# Patient Record
Sex: FEMALE | Race: WHITE | NOT HISPANIC OR LATINO | Employment: OTHER | URBAN - METROPOLITAN AREA
[De-identification: names, ages, dates, MRNs, and addresses within clinical notes are randomized per-mention and may not be internally consistent; named-entity substitution may affect disease eponyms.]

---

## 2017-04-24 ENCOUNTER — HOSPITAL ENCOUNTER (OUTPATIENT)
Facility: CLINIC | Age: 62
Discharge: HOME | End: 2017-04-25
Attending: OBSTETRICS & GYNECOLOGY | Admitting: OBSTETRICS & GYNECOLOGY
Payer: COMMERCIAL

## 2017-04-24 ENCOUNTER — ANESTHESIA EVENT (OUTPATIENT)
Dept: SURGERY | Facility: CLINIC | Age: 62
End: 2017-04-24
Payer: COMMERCIAL

## 2017-04-24 ENCOUNTER — ANESTHESIA (OUTPATIENT)
Dept: SURGERY | Facility: CLINIC | Age: 62
End: 2017-04-24
Payer: COMMERCIAL

## 2017-04-24 DIAGNOSIS — Z90.710 S/P LAPAROSCOPIC HYSTERECTOMY: Primary | ICD-10-CM

## 2017-04-24 PROCEDURE — 25000128 H RX IP 250 OP 636: Performed by: ANESTHESIOLOGY

## 2017-04-24 PROCEDURE — 25000132 ZZH RX MED GY IP 250 OP 250 PS 637: Performed by: OBSTETRICS & GYNECOLOGY

## 2017-04-24 PROCEDURE — 71000012 ZZH RECOVERY PHASE 1 LEVEL 1 FIRST HR: Performed by: OBSTETRICS & GYNECOLOGY

## 2017-04-24 PROCEDURE — 25000125 ZZHC RX 250: Performed by: NURSE ANESTHETIST, CERTIFIED REGISTERED

## 2017-04-24 PROCEDURE — 88307 TISSUE EXAM BY PATHOLOGIST: CPT | Mod: 26 | Performed by: OBSTETRICS & GYNECOLOGY

## 2017-04-24 PROCEDURE — 25800025 ZZH RX 258: Performed by: NURSE ANESTHETIST, CERTIFIED REGISTERED

## 2017-04-24 PROCEDURE — 88302 TISSUE EXAM BY PATHOLOGIST: CPT | Performed by: OBSTETRICS & GYNECOLOGY

## 2017-04-24 PROCEDURE — S0020 INJECTION, BUPIVICAINE HYDRO: HCPCS | Performed by: OBSTETRICS & GYNECOLOGY

## 2017-04-24 PROCEDURE — 71000013 ZZH RECOVERY PHASE 1 LEVEL 1 EA ADDTL HR: Performed by: OBSTETRICS & GYNECOLOGY

## 2017-04-24 PROCEDURE — 88302 TISSUE EXAM BY PATHOLOGIST: CPT | Mod: 26 | Performed by: OBSTETRICS & GYNECOLOGY

## 2017-04-24 PROCEDURE — 40000935 ZZH STATISTIC OUTPATIENT (NON-OBS) EVE

## 2017-04-24 PROCEDURE — 37000009 ZZH ANESTHESIA TECHNICAL FEE, EACH ADDTL 15 MIN: Performed by: OBSTETRICS & GYNECOLOGY

## 2017-04-24 PROCEDURE — 25000566 ZZH SEVOFLURANE, EA 15 MIN: Performed by: OBSTETRICS & GYNECOLOGY

## 2017-04-24 PROCEDURE — 40000936 ZZH STATISTIC OUTPATIENT (NON-OBS) NIGHT

## 2017-04-24 PROCEDURE — 25000125 ZZHC RX 250: Performed by: ANESTHESIOLOGY

## 2017-04-24 PROCEDURE — 27210794 ZZH OR GENERAL SUPPLY STERILE: Performed by: OBSTETRICS & GYNECOLOGY

## 2017-04-24 PROCEDURE — 25000128 H RX IP 250 OP 636: Performed by: NURSE ANESTHETIST, CERTIFIED REGISTERED

## 2017-04-24 PROCEDURE — 36000063 ZZH SURGERY LEVEL 4 EA 15 ADDTL MIN: Performed by: OBSTETRICS & GYNECOLOGY

## 2017-04-24 PROCEDURE — 88307 TISSUE EXAM BY PATHOLOGIST: CPT | Performed by: OBSTETRICS & GYNECOLOGY

## 2017-04-24 PROCEDURE — 40000170 ZZH STATISTIC PRE-PROCEDURE ASSESSMENT II: Performed by: OBSTETRICS & GYNECOLOGY

## 2017-04-24 PROCEDURE — S5010 5% DEXTROSE AND 0.45% SALINE: HCPCS | Performed by: OBSTETRICS & GYNECOLOGY

## 2017-04-24 PROCEDURE — 25800025 ZZH RX 258: Performed by: OBSTETRICS & GYNECOLOGY

## 2017-04-24 PROCEDURE — 25000128 H RX IP 250 OP 636: Performed by: OBSTETRICS & GYNECOLOGY

## 2017-04-24 PROCEDURE — 25000125 ZZHC RX 250: Performed by: OBSTETRICS & GYNECOLOGY

## 2017-04-24 PROCEDURE — 37000008 ZZH ANESTHESIA TECHNICAL FEE, 1ST 30 MIN: Performed by: OBSTETRICS & GYNECOLOGY

## 2017-04-24 PROCEDURE — 27210995 ZZH RX 272: Performed by: OBSTETRICS & GYNECOLOGY

## 2017-04-24 PROCEDURE — 36000093 ZZH SURGERY LEVEL 4 1ST 30 MIN: Performed by: OBSTETRICS & GYNECOLOGY

## 2017-04-24 RX ORDER — MAGNESIUM HYDROXIDE 1200 MG/15ML
LIQUID ORAL PRN
Status: DISCONTINUED | OUTPATIENT
Start: 2017-04-24 | End: 2017-04-24 | Stop reason: HOSPADM

## 2017-04-24 RX ORDER — CEFAZOLIN SODIUM 1 G/3ML
1 INJECTION, POWDER, FOR SOLUTION INTRAMUSCULAR; INTRAVENOUS SEE ADMIN INSTRUCTIONS
Status: DISCONTINUED | OUTPATIENT
Start: 2017-04-24 | End: 2017-04-24 | Stop reason: HOSPADM

## 2017-04-24 RX ORDER — HYDROMORPHONE HYDROCHLORIDE 1 MG/ML
0.2 INJECTION, SOLUTION INTRAMUSCULAR; INTRAVENOUS; SUBCUTANEOUS
Status: DISCONTINUED | OUTPATIENT
Start: 2017-04-24 | End: 2017-04-25 | Stop reason: HOSPADM

## 2017-04-24 RX ORDER — GLYCOPYRROLATE 0.2 MG/ML
INJECTION, SOLUTION INTRAMUSCULAR; INTRAVENOUS PRN
Status: DISCONTINUED | OUTPATIENT
Start: 2017-04-24 | End: 2017-04-24

## 2017-04-24 RX ORDER — ONDANSETRON 2 MG/ML
INJECTION INTRAMUSCULAR; INTRAVENOUS PRN
Status: DISCONTINUED | OUTPATIENT
Start: 2017-04-24 | End: 2017-04-24

## 2017-04-24 RX ORDER — NALOXONE HYDROCHLORIDE 0.4 MG/ML
.1-.4 INJECTION, SOLUTION INTRAMUSCULAR; INTRAVENOUS; SUBCUTANEOUS
Status: DISCONTINUED | OUTPATIENT
Start: 2017-04-24 | End: 2017-04-24 | Stop reason: HOSPADM

## 2017-04-24 RX ORDER — BUPIVACAINE HYDROCHLORIDE 2.5 MG/ML
INJECTION, SOLUTION INFILTRATION; PERINEURAL PRN
Status: DISCONTINUED | OUTPATIENT
Start: 2017-04-24 | End: 2017-04-24 | Stop reason: HOSPADM

## 2017-04-24 RX ORDER — LEVOTHYROXINE SODIUM 75 UG/1
75 TABLET ORAL DAILY
Status: DISCONTINUED | OUTPATIENT
Start: 2017-04-24 | End: 2017-04-25 | Stop reason: HOSPADM

## 2017-04-24 RX ORDER — NALOXONE HYDROCHLORIDE 0.4 MG/ML
.1-.4 INJECTION, SOLUTION INTRAMUSCULAR; INTRAVENOUS; SUBCUTANEOUS
Status: DISCONTINUED | OUTPATIENT
Start: 2017-04-24 | End: 2017-04-25 | Stop reason: HOSPADM

## 2017-04-24 RX ORDER — PROPOFOL 10 MG/ML
INJECTION, EMULSION INTRAVENOUS PRN
Status: DISCONTINUED | OUTPATIENT
Start: 2017-04-24 | End: 2017-04-24

## 2017-04-24 RX ORDER — CEFAZOLIN SODIUM 2 G/100ML
2 INJECTION, SOLUTION INTRAVENOUS
Status: COMPLETED | OUTPATIENT
Start: 2017-04-24 | End: 2017-04-24

## 2017-04-24 RX ORDER — KETOROLAC TROMETHAMINE 30 MG/ML
30 INJECTION, SOLUTION INTRAMUSCULAR; INTRAVENOUS EVERY 6 HOURS
Status: DISCONTINUED | OUTPATIENT
Start: 2017-04-24 | End: 2017-04-25 | Stop reason: HOSPADM

## 2017-04-24 RX ORDER — PROCHLORPERAZINE MALEATE 5 MG
5-10 TABLET ORAL EVERY 6 HOURS PRN
Status: DISCONTINUED | OUTPATIENT
Start: 2017-04-24 | End: 2017-04-25 | Stop reason: HOSPADM

## 2017-04-24 RX ORDER — ONDANSETRON 2 MG/ML
4 INJECTION INTRAMUSCULAR; INTRAVENOUS EVERY 30 MIN PRN
Status: DISCONTINUED | OUTPATIENT
Start: 2017-04-24 | End: 2017-04-24 | Stop reason: HOSPADM

## 2017-04-24 RX ORDER — FENTANYL CITRATE 50 UG/ML
25-50 INJECTION, SOLUTION INTRAMUSCULAR; INTRAVENOUS
Status: DISCONTINUED | OUTPATIENT
Start: 2017-04-24 | End: 2017-04-24 | Stop reason: HOSPADM

## 2017-04-24 RX ORDER — LIDOCAINE HYDROCHLORIDE 20 MG/ML
INJECTION, SOLUTION INFILTRATION; PERINEURAL PRN
Status: DISCONTINUED | OUTPATIENT
Start: 2017-04-24 | End: 2017-04-24

## 2017-04-24 RX ORDER — SCOLOPAMINE TRANSDERMAL SYSTEM 1 MG/1
1 PATCH, EXTENDED RELEASE TRANSDERMAL ONCE
Status: COMPLETED | OUTPATIENT
Start: 2017-04-24 | End: 2017-04-24

## 2017-04-24 RX ORDER — VECURONIUM BROMIDE 1 MG/ML
INJECTION, POWDER, LYOPHILIZED, FOR SOLUTION INTRAVENOUS PRN
Status: DISCONTINUED | OUTPATIENT
Start: 2017-04-24 | End: 2017-04-24

## 2017-04-24 RX ORDER — FENTANYL CITRATE 0.05 MG/ML
25-50 INJECTION, SOLUTION INTRAMUSCULAR; INTRAVENOUS EVERY 5 MIN PRN
Status: DISCONTINUED | OUTPATIENT
Start: 2017-04-24 | End: 2017-04-24 | Stop reason: HOSPADM

## 2017-04-24 RX ORDER — ONDANSETRON 2 MG/ML
4 INJECTION INTRAMUSCULAR; INTRAVENOUS EVERY 6 HOURS PRN
Status: DISCONTINUED | OUTPATIENT
Start: 2017-04-24 | End: 2017-04-25 | Stop reason: HOSPADM

## 2017-04-24 RX ORDER — CEFAZOLIN SODIUM 1 G/3ML
1 INJECTION, POWDER, FOR SOLUTION INTRAMUSCULAR; INTRAVENOUS EVERY 8 HOURS
Status: COMPLETED | OUTPATIENT
Start: 2017-04-24 | End: 2017-04-24

## 2017-04-24 RX ORDER — LIOTHYRONINE SODIUM 5 UG/1
10 TABLET ORAL DAILY
Status: DISCONTINUED | OUTPATIENT
Start: 2017-04-25 | End: 2017-04-25 | Stop reason: HOSPADM

## 2017-04-24 RX ORDER — OXYCODONE HYDROCHLORIDE 5 MG/1
5-10 TABLET ORAL
Status: DISCONTINUED | OUTPATIENT
Start: 2017-04-24 | End: 2017-04-25 | Stop reason: HOSPADM

## 2017-04-24 RX ORDER — ONDANSETRON 4 MG/1
4 TABLET, ORALLY DISINTEGRATING ORAL EVERY 30 MIN PRN
Status: DISCONTINUED | OUTPATIENT
Start: 2017-04-24 | End: 2017-04-24 | Stop reason: HOSPADM

## 2017-04-24 RX ORDER — ONDANSETRON 4 MG/1
4 TABLET, ORALLY DISINTEGRATING ORAL EVERY 6 HOURS PRN
Status: DISCONTINUED | OUTPATIENT
Start: 2017-04-24 | End: 2017-04-25 | Stop reason: HOSPADM

## 2017-04-24 RX ORDER — FENTANYL CITRATE 50 UG/ML
50-100 INJECTION, SOLUTION INTRAMUSCULAR; INTRAVENOUS
Status: DISCONTINUED | OUTPATIENT
Start: 2017-04-24 | End: 2017-04-24 | Stop reason: HOSPADM

## 2017-04-24 RX ORDER — KETOROLAC TROMETHAMINE 30 MG/ML
INJECTION, SOLUTION INTRAMUSCULAR; INTRAVENOUS PRN
Status: DISCONTINUED | OUTPATIENT
Start: 2017-04-24 | End: 2017-04-24

## 2017-04-24 RX ORDER — MEPERIDINE HYDROCHLORIDE 25 MG/ML
12.5 INJECTION INTRAMUSCULAR; INTRAVENOUS; SUBCUTANEOUS
Status: DISCONTINUED | OUTPATIENT
Start: 2017-04-24 | End: 2017-04-24 | Stop reason: HOSPADM

## 2017-04-24 RX ORDER — IBUPROFEN 600 MG/1
600 TABLET, FILM COATED ORAL EVERY 6 HOURS PRN
Status: DISCONTINUED | OUTPATIENT
Start: 2017-04-25 | End: 2017-04-25 | Stop reason: HOSPADM

## 2017-04-24 RX ORDER — NEOSTIGMINE METHYLSULFATE 1 MG/ML
VIAL (ML) INJECTION PRN
Status: DISCONTINUED | OUTPATIENT
Start: 2017-04-24 | End: 2017-04-24

## 2017-04-24 RX ORDER — PROPOFOL 10 MG/ML
INJECTION, EMULSION INTRAVENOUS CONTINUOUS PRN
Status: DISCONTINUED | OUTPATIENT
Start: 2017-04-24 | End: 2017-04-24

## 2017-04-24 RX ORDER — METOCLOPRAMIDE 10 MG/1
10 TABLET ORAL EVERY 6 HOURS PRN
Status: DISCONTINUED | OUTPATIENT
Start: 2017-04-24 | End: 2017-04-25 | Stop reason: HOSPADM

## 2017-04-24 RX ORDER — SODIUM CHLORIDE, SODIUM LACTATE, POTASSIUM CHLORIDE, CALCIUM CHLORIDE 600; 310; 30; 20 MG/100ML; MG/100ML; MG/100ML; MG/100ML
INJECTION, SOLUTION INTRAVENOUS CONTINUOUS PRN
Status: DISCONTINUED | OUTPATIENT
Start: 2017-04-24 | End: 2017-04-24

## 2017-04-24 RX ORDER — PHENAZOPYRIDINE HYDROCHLORIDE 200 MG/1
200 TABLET, FILM COATED ORAL ONCE
Status: COMPLETED | OUTPATIENT
Start: 2017-04-24 | End: 2017-04-24

## 2017-04-24 RX ORDER — FENTANYL CITRATE 50 UG/ML
INJECTION, SOLUTION INTRAMUSCULAR; INTRAVENOUS PRN
Status: DISCONTINUED | OUTPATIENT
Start: 2017-04-24 | End: 2017-04-24

## 2017-04-24 RX ORDER — SODIUM CHLORIDE, SODIUM LACTATE, POTASSIUM CHLORIDE, CALCIUM CHLORIDE 600; 310; 30; 20 MG/100ML; MG/100ML; MG/100ML; MG/100ML
INJECTION, SOLUTION INTRAVENOUS CONTINUOUS
Status: DISCONTINUED | OUTPATIENT
Start: 2017-04-24 | End: 2017-04-24 | Stop reason: HOSPADM

## 2017-04-24 RX ORDER — METOCLOPRAMIDE HYDROCHLORIDE 5 MG/ML
10 INJECTION INTRAMUSCULAR; INTRAVENOUS EVERY 6 HOURS PRN
Status: DISCONTINUED | OUTPATIENT
Start: 2017-04-24 | End: 2017-04-25 | Stop reason: HOSPADM

## 2017-04-24 RX ADMIN — FENTANYL CITRATE 25 MCG: 50 INJECTION, SOLUTION INTRAMUSCULAR; INTRAVENOUS at 11:43

## 2017-04-24 RX ADMIN — CEFAZOLIN SODIUM 1 G: 1 INJECTION, POWDER, FOR SOLUTION INTRAMUSCULAR; INTRAVENOUS at 21:17

## 2017-04-24 RX ADMIN — KETOROLAC TROMETHAMINE 30 MG: 30 INJECTION, SOLUTION INTRAMUSCULAR at 17:38

## 2017-04-24 RX ADMIN — ONDANSETRON 4 MG: 2 INJECTION INTRAMUSCULAR; INTRAVENOUS at 10:29

## 2017-04-24 RX ADMIN — FENTANYL CITRATE 50 MCG: 50 INJECTION, SOLUTION INTRAMUSCULAR; INTRAVENOUS at 09:13

## 2017-04-24 RX ADMIN — CEFAZOLIN SODIUM 2 G: 2 INJECTION, SOLUTION INTRAVENOUS at 07:51

## 2017-04-24 RX ADMIN — ROCURONIUM BROMIDE 50 MG: 10 INJECTION INTRAVENOUS at 07:37

## 2017-04-24 RX ADMIN — FENTANYL CITRATE 50 MCG: 50 INJECTION, SOLUTION INTRAMUSCULAR; INTRAVENOUS at 08:07

## 2017-04-24 RX ADMIN — PROPOFOL: 10 INJECTION, EMULSION INTRAVENOUS at 08:53

## 2017-04-24 RX ADMIN — FENTANYL CITRATE 50 MCG: 50 INJECTION, SOLUTION INTRAMUSCULAR; INTRAVENOUS at 08:03

## 2017-04-24 RX ADMIN — GLYCOPYRROLATE 0.4 MG: 0.2 INJECTION, SOLUTION INTRAMUSCULAR; INTRAVENOUS at 11:49

## 2017-04-24 RX ADMIN — MIDAZOLAM HYDROCHLORIDE 2 MG: 1 INJECTION, SOLUTION INTRAMUSCULAR; INTRAVENOUS at 07:31

## 2017-04-24 RX ADMIN — PROPOFOL 140 MG: 10 INJECTION, EMULSION INTRAVENOUS at 07:37

## 2017-04-24 RX ADMIN — FENTANYL CITRATE 25 MCG: 50 INJECTION, SOLUTION INTRAMUSCULAR; INTRAVENOUS at 10:55

## 2017-04-24 RX ADMIN — FENTANYL CITRATE 25 MCG: 50 INJECTION, SOLUTION INTRAMUSCULAR; INTRAVENOUS at 11:40

## 2017-04-24 RX ADMIN — HYDROMORPHONE HYDROCHLORIDE 0.5 MG: 1 INJECTION, SOLUTION INTRAMUSCULAR; INTRAVENOUS; SUBCUTANEOUS at 12:49

## 2017-04-24 RX ADMIN — PHENAZOPYRIDINE HYDROCHLORIDE 200 MG: 200 TABLET ORAL at 06:28

## 2017-04-24 RX ADMIN — SODIUM CHLORIDE, POTASSIUM CHLORIDE, SODIUM LACTATE AND CALCIUM CHLORIDE: 600; 310; 30; 20 INJECTION, SOLUTION INTRAVENOUS at 10:53

## 2017-04-24 RX ADMIN — VECURONIUM BROMIDE 2 MG: 1 INJECTION, POWDER, LYOPHILIZED, FOR SOLUTION INTRAVENOUS at 08:51

## 2017-04-24 RX ADMIN — DEXTROSE AND SODIUM CHLORIDE: 5; 450 INJECTION, SOLUTION INTRAVENOUS at 15:45

## 2017-04-24 RX ADMIN — CEFAZOLIN 1 G: 1 INJECTION, POWDER, FOR SOLUTION INTRAMUSCULAR; INTRAVENOUS at 11:40

## 2017-04-24 RX ADMIN — FENTANYL CITRATE 100 MCG: 50 INJECTION, SOLUTION INTRAMUSCULAR; INTRAVENOUS at 07:37

## 2017-04-24 RX ADMIN — NEOSTIGMINE METHYLSULFATE 2.5 MG: 1 INJECTION INTRAMUSCULAR; INTRAVENOUS; SUBCUTANEOUS at 11:49

## 2017-04-24 RX ADMIN — VECURONIUM BROMIDE 1 MG: 1 INJECTION, POWDER, LYOPHILIZED, FOR SOLUTION INTRAVENOUS at 09:49

## 2017-04-24 RX ADMIN — FENTANYL CITRATE 25 MCG: 50 INJECTION, SOLUTION INTRAMUSCULAR; INTRAVENOUS at 10:52

## 2017-04-24 RX ADMIN — LIDOCAINE HYDROCHLORIDE 100 MG: 20 INJECTION, SOLUTION INFILTRATION; PERINEURAL at 07:37

## 2017-04-24 RX ADMIN — CEFAZOLIN 1 G: 1 INJECTION, POWDER, FOR SOLUTION INTRAMUSCULAR; INTRAVENOUS at 09:45

## 2017-04-24 RX ADMIN — HYDROMORPHONE HYDROCHLORIDE 0.5 MG: 1 INJECTION, SOLUTION INTRAMUSCULAR; INTRAVENOUS; SUBCUTANEOUS at 13:17

## 2017-04-24 RX ADMIN — PROPOFOL 100 MCG/KG/MIN: 10 INJECTION, EMULSION INTRAVENOUS at 07:40

## 2017-04-24 RX ADMIN — KETOROLAC TROMETHAMINE 30 MG: 30 INJECTION, SOLUTION INTRAMUSCULAR at 11:46

## 2017-04-24 RX ADMIN — SODIUM CHLORIDE, POTASSIUM CHLORIDE, SODIUM LACTATE AND CALCIUM CHLORIDE: 600; 310; 30; 20 INJECTION, SOLUTION INTRAVENOUS at 08:45

## 2017-04-24 RX ADMIN — ONDANSETRON 4 MG: 2 INJECTION INTRAMUSCULAR; INTRAVENOUS at 08:59

## 2017-04-24 RX ADMIN — SODIUM CHLORIDE, POTASSIUM CHLORIDE, SODIUM LACTATE AND CALCIUM CHLORIDE: 600; 310; 30; 20 INJECTION, SOLUTION INTRAVENOUS at 07:31

## 2017-04-24 RX ADMIN — PROPOFOL: 10 INJECTION, EMULSION INTRAVENOUS at 09:47

## 2017-04-24 RX ADMIN — SCOPALAMINE 1 PATCH: 1 PATCH, EXTENDED RELEASE TRANSDERMAL at 06:38

## 2017-04-24 RX ADMIN — HYDROMORPHONE HYDROCHLORIDE 0.5 MG: 1 INJECTION, SOLUTION INTRAMUSCULAR; INTRAVENOUS; SUBCUTANEOUS at 13:04

## 2017-04-24 RX ADMIN — FENTANYL CITRATE 50 MCG: 50 INJECTION, SOLUTION INTRAMUSCULAR; INTRAVENOUS at 10:05

## 2017-04-24 ASSESSMENT — ENCOUNTER SYMPTOMS
DYSRHYTHMIAS: 0
SEIZURES: 0
ORTHOPNEA: 0

## 2017-04-24 ASSESSMENT — LIFESTYLE VARIABLES: TOBACCO_USE: 0

## 2017-04-24 NOTE — IP AVS SNAPSHOT
[facility] Observation Unit    [address] [place] [id number]    Phone:  [phone]                                       After Visit Summary   4/24/2017    Joyce Ortiz    MRN: [id number]           After Visit Summary Signature Page     I have received my discharge instructions, and my questions have been answered. I have discussed any challenges I see with this plan with the nurse or doctor.    ..........................................................................................................................................  Patient/Patient Representative Signature      ..........................................................................................................................................  Patient Representative Print Name and Relationship to Patient    ..................................................               ................................................  Date                                            Time    ..........................................................................................................................................  Reviewed by Signature/Title    ...................................................              ..............................................  Date                                                            Time

## 2017-04-24 NOTE — OR NURSING
Report called to ELSIE Josue in obs. Care unit, VSS, pt. Reports pain is tolerable, denies nausea.

## 2017-04-24 NOTE — ANESTHESIA PREPROCEDURE EVALUATION
Procedure: Procedure(s):  LAPAROSCOPIC HYSTERECTOMY TOTAL  LABIAPLASTY  Preop diagnosis: ENLARGED LABIA, POST MENOPASUAL BLEEDING, UTERINE FIBROIDS    Allergies   Allergen Reactions     Clindamycin Rash     Rash covered entire body.     Latex Itching     Seasonal Allergies      Erythromycin Rash     Past Medical History:   Diagnosis Date     Basal cell carcinoma      Cutaneous T-cell lymphoma (H)      Gastroesophageal reflux disease      Heart murmur     faint     Hypertension      Hypothyroidism (acquired)      Motion sickness      PONV (postoperative nausea and vomiting)      Past Surgical History:   Procedure Laterality Date     ARTHROSCOPY SHOULDER Right 1990    Dr. Calhoun     BIOPSY OF SKIN LESION       BONE MARROW ASPIRATION AND EXAM       COLONOSCOPY       EYE SURGERY      cataract surgery bilateral     GYN SURGERY      bartholin cyst     HC KNEE SCOPE,MED/LAT MENISCUS REPAIR Left 7/1/10    Dr. Souza     HC REDUCTION OF LARGE BREAST  1978/1998    Dr. Downing/Dr. Campa     HYSTEROSCOPY      D&C     OOPHORECTOMY  3/26/10    Dr. Louie     ORTHOPEDIC SURGERY Right 2016    right knee arthroscopy     Prior to Admission medications    Medication Sig Start Date End Date Taking? Authorizing Provider   Nutritional Supplements (DHEA PO) Take 25 mg by mouth daily   Yes Reported, Patient   NAPROXEN PO Take 250 mg by mouth 2 times daily   Yes Reported, Patient   Fish Oil-Cholecalciferol (FISH OIL-VITAMIN D PO) Take 4 capsules by mouth daily   Yes Reported, Patient   Progesterone Micronized (PROGESTERONE PO) Take 200 mg by mouth daily   Yes Reported, Patient   NONFORMULARY 15 mg by IMPLANT route every 4 months Implantable Pellet: Estradiol   Yes Reported, Patient   NONFORMULARY 100 mg by IMPLANT route every 4 months Implantable Pellet: Testosterone   Yes Reported, Patient   NONFORMULARY Take 1 tablet by mouth 2 times daily Hair Repair  Contains:   5000mcg Biotin  25mg Zinc  75mg Selenium  600mg Saw Palmetto   Yes Reported,  Patient   Carboxymethylcellulose Sodium (REFRESH TEARS OP) Place 1 drop into both eyes daily as needed   Yes Reported, Patient   Levothyroxine Sodium (SYNTHROID PO) Take 75 mcg by mouth daily   Yes Reported, Patient   Liothyronine Sodium (CYTOMEL PO) Take 10 mcg by mouth daily   Yes Reported, Patient   Nutritional Supplements (NUTRITIONAL SUPPLEMENT PO) Take 9 tablets by mouth daily Formulated Supplement  Contains:  200mg Alpha Lipoic Acid  25mg B6  2500iu Beta Carotene  400mcg Biotin  30mg CoQ10  400mg Magnesium Glycinate  3mg Manganese Gluconate  1.2mg Methyl Folate  1000mcg Methylcobalamin  75mcg Molybdenum  25mg Niacin  25mg Niacinamide  25mg P5P  25mg R5P  25mg Riboflavin  50mg Thiamine  2500iu Vitamin A  200iu Vitamin D  5mg Zinc Glycinate  5mg Zinc Picolinate   Yes Reported, Patient   clobetasol (TEMOVATE) 0.05 % cream Apply topically 2 times daily    Yes Reported, Patient   Probiotic Product (PROBIOTIC DAILY PO) Take 3 capsules by mouth daily    Yes Reported, Patient     Current Facility-Administered Medications Ordered in Epic   Medication Dose Route Frequency Last Rate Last Dose     phenazopyridine (PYRIDIUM) tablet 200 mg  200 mg Oral Once         ceFAZolin sodium-dextrose (ANCEF) infusion 2 g  2 g Intravenous Pre-Op/Pre-procedure x 1 dose         ceFAZolin (ANCEF) 1 g vial to attach to  ml bag for ADULT or 50 ml bag for PEDS  1 g Intravenous See Admin Instructions         No current [facility]-ordered outpatient prescriptions on file.     Wt Readings from Last 1 Encounters:   04/24/17 67.4 kg (148 lb 8 oz)     Temp Readings from Last 1 Encounters:   04/24/17 35.7  C (96.3  F) (Oral)     BP Readings from Last 6 Encounters:   04/24/17 127/69   11/07/16 110/73   11/23/15 140/82   05/11/15 125/78   09/22/14 112/76   06/25/14 115/74     Pulse Readings from Last 4 Encounters:   11/07/16 63   11/23/15 65   05/11/15 61   09/22/14 60     Resp Readings from Last 1 Encounters:   04/24/17 16     SpO2 Readings from  Last 1 Encounters:   04/24/17 97%     Recent Labs   Lab Test  06/17/14   0921  05/12/14   1454   WBC  8.1  9.4   HGB  13.6  13.5   PLT  196  190     RECENT LABS:     ECG: NSR, no abnormalities     Anesthesia Evaluation     . Pt has had prior anesthetic. Type: General    History of anesthetic complications   - PONV        ROS/MED HX    ENT/Pulmonary:      (-) tobacco use, asthma, sleep apnea and recent URI   Neurologic:      (-) seizures and CVA   Cardiovascular:     (+) hypertension----. : . . . :. valvular problems/murmurs Innocent Heart Murmur (asymptomatic) :.      (-) angina, CAD, orthopnea/PND, syncope, arrhythmias, irregular heartbeat/palpitations and angina   METS/Exercise Tolerance:  >4 METS   Hematologic:        (-) History of Transfusion   Musculoskeletal:         GI/Hepatic:     (+) GERD (occasional - certain foods/oral intake) Other,      (-) liver disease   Renal/Genitourinary:      (-) renal disease   Endo:     (+) thyroid problem hypothyroidism, .   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  No evidence of lymphadenopathy.        Other:                     Physical Exam  Normal systems: dental    Airway   Mallampati: II  TM distance: >3 FB  Neck ROM: full    Dental     Cardiovascular   Rhythm and rate: regular and normal  (-) no murmur    Pulmonary    breath sounds clear to auscultation(-) no wheezes                    Anesthesia Plan      History & Physical Review  History and physical reviewed and following examination; no interval change.    ASA Status:  3 .    NPO Status:  > 8 hours    Plan for General and ETT with Propofol and Intravenous  induction. Maintenance will be Balanced.    PONV prophylaxis:  Ondansetron (or other 5HT-3), Dexamethasone or Solumedrol, Other (See comment) and Scopolamine patch (Propofol infusion )  Avoid Nitrous Oxide  Zofran 8 mg (divided)   Mostly Propofol/light Sevo       Postoperative Care  Postoperative pain management:  Multi-modal analgesia.      Consents  Anesthetic plan, risks, benefits and alternatives discussed with:  Patient..

## 2017-04-24 NOTE — ANESTHESIA POSTPROCEDURE EVALUATION
Patient: Joyce Ortiz    Procedure(s):  TOTAL LAPAROSCOPIC HYSTERECTOMY, Cystoscopy, LABIAPLASTY Bilateral - Wound Class: II-Clean Contaminated   - Wound Class: I-Clean   - Wound Class: II-Clean Contaminated    Diagnosis:ENLARGED LABIA, POST MENOPASUAL BLEEDING, UTERINE FIBROIDS  Diagnosis Additional Information: No value filed.    Anesthesia Type:  General, ETT    Note:  Anesthesia Post Evaluation    Patient location during evaluation: PACU  Patient participation: Able to fully participate in evaluation  Level of consciousness: awake  Pain management: adequate  Airway patency: patent  Cardiovascular status: acceptable  Respiratory status: acceptable  Hydration status: acceptable  PONV: none     Anesthetic complications: None          Last vitals:  Vitals:    04/24/17 1345 04/24/17 1400 04/24/17 1444   BP: 105/68 108/52 113/54   Resp: 14 14 14   Temp:   36.7  C (98  F)   SpO2: 95%  96%         Electronically Signed By: Aneesh Olivares MD  April 24, 2017  3:31 PM

## 2017-04-24 NOTE — ANESTHESIA CARE TRANSFER NOTE
Patient: Joyce Ortiz    Procedure(s):  TOTAL LAPAROSCOPIC HYSTERECTOMY, Cystoscopy, LABIAPLASTY Bilateral - Wound Class: II-Clean Contaminated   - Wound Class: I-Clean   - Wound Class: II-Clean Contaminated    Diagnosis: ENLARGED LABIA, POST MENOPASUAL BLEEDING, UTERINE FIBROIDS  Diagnosis Additional Information: No value filed.    Anesthesia Type:   General, ETT     Note:  Airway :Face Mask  Patient transferred to:PACU  Comments: Exchanging well. VSS      Vitals: (Last set prior to Anesthesia Care Transfer)    CRNA VITALS  4/24/2017 1137 - 4/24/2017 1213      4/24/2017             Pulse: 64    SpO2: 100 %    Resp Rate (set): 10                Electronically Signed By: YUSUF Snyder CRNA  April 24, 2017  12:13 PM

## 2017-04-24 NOTE — IP AVS SNAPSHOT
MRN:[id number]                      After Visit Summary   4/24/2017    Joyce Ortiz    MRN: [id number]           Thank you!     Thank you for choosing [facility] for your care. Our goal is always to provide you with excellent care. Hearing back from our patients is one way we can continue to improve our services. Please take a few minutes to complete the written survey that you may receive in the mail after you visit with us. Thank you!        Patient Information     Date Of Birth          1955        About your hospital stay     You were admitted on:  April 24, 2017 You last received care in the:  [facility] Observation Unit    You were discharged on:  April 25, 2017       Who to Call     For medical emergencies, please call 911.  For non-urgent questions about your medical care, please call your primary care provider or clinic, [phone]  For questions related to your surgery, please call your surgery clinic        Attending Provider     Provider Specialty    Fern Louie MD OB/Gyn       Primary Care Provider Office Phone # Fax #    Christina Vargas [phone]       [facility] [address] [facility] [address]  [facility] [address]        After Care Instructions     Activity       Your activity upon discharge: activity as tolerated.  Nothing in vagina for 6wks.  No heavy lifting or exercise for 6wks.            Diet       Follow this diet upon discharge: Orders Placed This Encounter      Regular Diet Adult            Diet Instructions       Resume pre-procedure diet            Discharge Instructions       Patient to follow up with appointment in 2 weeks            Ice to affected area       Ice to operative site PRN            No Alcohol       For 24 hours post procedure                  Follow-up Appointments     Follow-up and recommended labs and tests        RTC 2wks                  Further instructions from your care team       Discharge Instructions following  Vaginal or Abdominal Hysterectomy    Diet:    You may feel less hungry at first.  Try to eat a well balanced diet with lots of proteins, fruits, vegetables, and whole grains.  Avoid spicy and greasy foods.    Drink plenty of fluids - at least 8 tall glasses a day.  Water is best.  Try to limit caffeine (found in coffee, tea, and cola drinks).  This helps prevent constipation (hard stools that are difficult to pass).    If constipation is a problem, consider one of these medicines: docusate (Colace), docusate with casanthranol (Nanci-Colace), psyllium (Metamucil) or milk of magnesia.  You can buy these at the drug store.  Follow the instructions listed on the label.  Activity:    You will need plenty of rest at first.  Slowly go back to your normal activities.  It will help to take several short walks during the day.  It is ok to climb stairs, but use the handrail in case you get dizzy.    Do not drive while using strong pain medications (narcotics).  After that, do not drive until you can stomp on the brakes without pain or flinching.      Do not use tampons, douche, or have sex (intercourse) until you see your doctor.    Don t lift or push anything over 15 lbs until your doctor says it s safe.  Avoid heavy or strenuous exercise.    You may start gentle exercises after two weeks.    Listen to your body.  If you feel tired or have backaches, you may be doing too much too quickly.  Pain control:    You pain should improve over the next 2-3 weeks.  If you have increased pain, please call the clinic.  It is normal to see a little sore after mild exercise.    Always take your pain medicine as directed by your doctor.  Drink a full glass of water with each dose.      Caring for your incision:    You may see a small amount of fluid draining from your incision (cut).  This is normal.  You may wear a bandage until the drainage stops.    Keep the area clean and dry.  Do not use lotions, powders, or perfumes on the site.    If  present, Steri-Strips (small, white tape) may fall off on their own.  If not, remove them after 7 days.    If present, staples will be removed at your next visit.    If present, Dermabond (medical glue used on the skin) will wear off on its own, do not peel it off.  Bathing    Use care to avoid slips and falls.  Gently pat your incisions dry after bathing.    After abdominal hysterectomy (through the belly): you may shower.  Avoid tub baths and swimming for two weeks, or until your cuts heal.    After vaginal hysterectomy (surgery through the vagina): you may bathe or shower as usual.  If you had surgery to repair the vaginal wall, it may help to soak in a warm bath for 20 minutes twice daily.  This will speed healing and reduce tenderness.    If you have a catheter (tube in your bladder) - shower only.  Normal Symptoms:    You may notice a small amount of bleeding or fluid coming from your vagina.  This could last for several weeks.  Wear pads as needed.    You may see stitches poking out of the vagina.  You may also see stitches pass through the vagina (they will look like tiny threads).  Both of these are normal.  Most stitches will dissolve within three months.    The area around your stitches may feel numb.  This should go away in less than a year.    After surgery, it is common to feel dizzy or lightheaded at times.  You might also have hot flashes, trouble sleeping, sudden mood swings and irritability.  If any of these symptoms become a problem, please call your doctor s office.  If you had a vaginal repair, you may feel tugging or pulling in the vagina.  This is a normal part of healing.  Hormones:    If we removed your ovaries, you may need hormone medicine (HT, or hormone therapy).  Discuss this with your doctor.  If we did not remove your ovaries, you should reach menopause at the normal time (in general, between ages 40 and 55).        Notify your surgeon for the following signs and symptoms:    Severe  "chills and temperature of 100.4 oF or greater, taken under the tongue.    Bright red blood or large clots coming out of the vagina - enough to soak one pad (sanitary napkin) per hour.    Increased pain, warmth, swelling, redness at surgery site.    Foul smelling, green/yellow discharge from incision.    Urine or vaginal fluid that smells bad.    You cannot urinate (use the toilet), it burns when you urinate, or you need to go more often.    Severe nausea (feeling sick to your stomach) or vomiting (throwing up).    Increased pain that you cannot control with medicine.    Dustin pain and swelling in one or both legs.    Any questions or concerns.      Pending Results     Date and Time Order Name Status Description    4/24/2017 0941 Surgical pathology exam In process             Statement of Approval     Ordered          04/25/17 0709  I have reviewed and agree with all the recommendations and orders detailed in this document.  EFFECTIVE NOW     Comments:  Discharge to home today if able to void this morning./LBakerMD   Approved and electronically signed by:  Fern Louie MD             Admission Information     Date & Time Provider Department Dept. Phone    4/24/2017 Fern Louie MD [facility] Unit [phone]      Your Vitals Were     Blood Pressure Temperature Respirations Height Weight Pulse Oximetry    128/61 (BP Location: Right arm) 99.3  F (37.4  C) (Oral) 16 1.549 m (5' 1\") 67.4 kg (148 lb 8 oz) 98%    BMI (Body Mass Index)                   28.06 kg/m2           [employer] Information     [employer] gives you secure access to your electronic health record. If you see a primary care provider, you can also send messages to your care team and make appointments. If you have questions, please call your primary care clinic.  If you do not have a primary care provider, please call [phone] and they will assist you.        Care EveryWhere ID     This is your Care EveryWhere ID. This could be used by " other organizations to access your [facility] medical records  [id number]           Review of your medicines      START taking        Dose / Directions    oxyCODONE 5 MG IR tablet   Commonly known as:  ROXICODONE        Dose:  5-10 mg   Take 1-2 tablets (5-10 mg) by mouth every 3 hours as needed for moderate to severe pain   Quantity:  10 tablet   Refills:  0         CONTINUE these medicines which have NOT CHANGED        Dose / Directions    clobetasol 0.05 % cream   Commonly known as:  TEMOVATE        Apply topically 2 times daily   Refills:  0       CYTOMEL PO        Dose:  10 mcg   Take 10 mcg by mouth daily   Refills:  0       DHEA PO        Dose:  25 mg   Take 25 mg by mouth daily   Refills:  0       FISH OIL-VITAMIN D PO        Dose:  4 capsule   Take 4 capsules by mouth daily   Refills:  0       NAPROXEN PO        Dose:  250 mg   Take 250 mg by mouth 2 times daily   Refills:  0       * NONFORMULARY        Dose:  15 mg   15 mg by IMPLANT route every 4 months Implantable Pellet: Estradiol   Refills:  0       * NONFORMULARY        Dose:  100 mg   100 mg by IMPLANT route every 4 months Implantable Pellet: Testosterone   Refills:  0       * NONFORMULARY        Dose:  1 tablet   Take 1 tablet by mouth 2 times daily Hair Repair Contains:  5000mcg Biotin 25mg Zinc 75mg Selenium 600mg Saw Palmetto   Refills:  0       NUTRITIONAL SUPPLEMENT PO        Dose:  9 tablet   Take 9 tablets by mouth daily Formulated Supplement Contains: 200mg Alpha Lipoic Acid 25mg B6 2500iu Beta Carotene 400mcg Biotin 30mg CoQ10 400mg Magnesium Glycinate 3mg Manganese Gluconate 1.2mg Methyl Folate 1000mcg Methylcobalamin 75mcg Molybdenum 25mg Niacin 25mg Niacinamide 25mg P5P 25mg R5P 25mg Riboflavin 50mg Thiamine 2500iu Vitamin A 200iu Vitamin D 5mg Zinc Glycinate 5mg Zinc Picolinate   Refills:  0       PROBIOTIC DAILY PO   Used for:  Mycosis fungoides (H)        Dose:  3 capsule   Take 3 capsules by mouth daily   Refills:  0        PROGESTERONE PO        Dose:  200 mg   Take 200 mg by mouth daily   Refills:  0       REFRESH TEARS OP        Dose:  1 drop   Place 1 drop into both eyes daily as needed   Refills:  0       SYNTHROID PO        Dose:  75 mcg   Take 75 mcg by mouth daily   Refills:  0       * Notice:  This list has 3 medication(s) that are the same as other medications prescribed for you. Read the directions carefully, and ask your doctor or other care provider to review them with you.         Where to get your medicines      Some of these will need a paper prescription and others can be bought over the counter. Ask your nurse if you have questions.     Bring a paper prescription for each of these medications     oxyCODONE 5 MG IR tablet                Protect others around you: Learn how to safely use, store and throw away your medicines at www.disposemymeds.org.             Medication List: This is a list of all your medications and when to take them. Check marks below indicate your daily home schedule. Keep this list as a reference.      Medications           Morning Afternoon Evening Bedtime As Needed    clobetasol 0.05 % cream   Commonly known as:  TEMOVATE   Apply topically 2 times daily                                CYTOMEL PO   Take 10 mcg by mouth daily   Last time this was given:  10 mcg on 4/25/2017  7:10 AM                                DHEA PO   Take 25 mg by mouth daily                                FISH OIL-VITAMIN D PO   Take 4 capsules by mouth daily                                NAPROXEN PO   Take 250 mg by mouth 2 times daily                                * NONFORMULARY   15 mg by IMPLANT route every 4 months Implantable Pellet: Estradiol                                * NONFORMULARY   100 mg by IMPLANT route every 4 months Implantable Pellet: Testosterone                                * NONFORMULARY   Take 1 tablet by mouth 2 times daily Hair Repair Contains:  5000mcg Biotin 25mg Zinc 75mg Selenium 600mg Saw  Palmetto                                NUTRITIONAL SUPPLEMENT PO   Take 9 tablets by mouth daily Formulated Supplement Contains: 200mg Alpha Lipoic Acid 25mg B6 2500iu Beta Carotene 400mcg Biotin 30mg CoQ10 400mg Magnesium Glycinate 3mg Manganese Gluconate 1.2mg Methyl Folate 1000mcg Methylcobalamin 75mcg Molybdenum 25mg Niacin 25mg Niacinamide 25mg P5P 25mg R5P 25mg Riboflavin 50mg Thiamine 2500iu Vitamin A 200iu Vitamin D 5mg Zinc Glycinate 5mg Zinc Picolinate                                oxyCODONE 5 MG IR tablet   Commonly known as:  ROXICODONE   Take 1-2 tablets (5-10 mg) by mouth every 3 hours as needed for moderate to severe pain                                PROBIOTIC DAILY PO   Take 3 capsules by mouth daily                                PROGESTERONE PO   Take 200 mg by mouth daily                                REFRESH TEARS OP   Place 1 drop into both eyes daily as needed                                SYNTHROID PO   Take 75 mcg by mouth daily   Last time this was given:  75 mcg on 4/25/2017  7:10 AM                                * Notice:  This list has 3 medication(s) that are the same as other medications prescribed for you. Read the directions carefully, and ask your doctor or other care provider to review them with you.

## 2017-04-24 NOTE — PROCEDURES
OPERATIVE REPORT  Hospital: [facility]     Patient: Joyce Ortiz   :  1955  Date of Procedure/Consult: 17     SURGEONS: Fern Louie MD and Carine Cervantes DO    ASSISTANT SURGEON: MATT Webber    REPORT OF OPERATION:    PREOPERATIVE DIAGNOSIS: ENLARGED LABIA, POST MENOPASUAL BLEEDING, UTERINE FIBROIDS     POSTOPERATIVE DIAGNOSIS:  SAME    PROCEDURE PERFORMED: Procedure/Surgery Information   Procedure: Procedure(s):  TOTAL LAPAROSCOPIC HYSTERECTOMY, Cystoscopy, LABIAPLASTY Bilateral - Wound Class: II-Clean Contaminated   - Wound Class: I-Clean   - Wound Class: II-Clean Contaminated       Specimens:   ID Type Source Tests Collected by Time Destination   A : Uterus and Cervix Tissue Uterus and Cervix SURGICAL PATHOLOGY EXAM Fern Louie MD 2017  9:40 AM    B : Labial Skin Tissue Other SURGICAL PATHOLOGY EXAM Fern Louie MD 2017 11:30 AM       Non-operative procedures None performed   EBL 25ml  Drains: none  Specimen Removed: uterus/cervix  Findings: Uterus 8 weeks size with multiple fibroids, requiring coring. No endo or adnexal adhesions noted from previous BSO. Normal cysto with BL ureteral jets noted. Vaginal cuff closed both LS and angles reinforced vaginally as well.   Grafts or implants: none    DESCRIPTION OF SURGERY: The patient was taken to the operating room and placed in the supine position. General anesthesia was induced, and she was intubated. Her legs were elevated in the Yellofin stirrups, and she was prepped and draped in the usual manner for a laparoscopic hysterectomy. A proper TIME OUT was taken. A speculum and retractors was placed in the vagina, a single-tooth tenaculum placed on the anterior lip of the cervix. The cervix was probed and the uterine cavity sounded to 10 cm. Stay sutures of 0-Vicryl were placed on the cervix at 3 and 9 o'clock. The tenaculum and speculum were removed, and the V care uterine manipultor was placed and the stay sutures tied to fit  the cup squarely on the cervix, and then the 5 cc balloon was filled with air.  Gloves were changed.  A 5-mm skin incision was made. Then a 5-mm step up with verres port was placed. A drop in saline test was found. The gas was connected, and a pneumoperitoneum was insufflated with an opening pressure of 4mmHg. In the left lower quadrant, in a vessel-free area, a 5 mm skin incision was made, and a 5-mm port was placed under direct visualization. A 5-mm port was placed in the same manner in the right lower quadrant, and the left upper quadrant making a small incision. This was also done under direct visualization. The pelvis and abdomen were inspected and above findings were noted.   Using the Thunderbeat, we did the right side first, by  ligating the infundibulopelvic  ligament on that side, and the round ligament.  We then opened up the broad ligament on that side and then took the bladder down off the lower portion of the uterus with the Thunderbeat. We then used the Thunderbeat to seal off and ligate the uterine artery vessels on the that side. We then took the Thunderbeat, put it in through the opposite port, and did the same thing on the other side with the infundibulopelvic ligament, the round ligament, the broad ligament, the uterine artery vessels, and also moving the bladder down on that side as well.   The bladder was bluntly dissected off the lower uterine segment until the vaginal cuff could be identified by the V-Care cuff distending the vaginal apex. The uterosacral and cardinal ligaments were then taken down bilaterally with the Thunderbeat. The vaginal vault was then entered with the J-hook and the cervix was amputated using the Thunderbeat. The uterus, and cervix were then pulled into the vagina, however due to the size the 8wk sized uterus needed to be cored in order to allow this to be removed vaginally. A glove filled with a lap was used to maintain an adequate pneumoperitoneam. The vaginal cuff  was then closed through the laparoscope with a 0 Quill suture.  The abdomen was thoroughly irrigated. Hemostasis was adequate. The gas was allowed to escape. We then turned our attention to the vaginal cuff from below and we were able to place 4 single interrupted sutures of 0 vicryl at the angles and then two in between. We then looked above again and excellent hemostasis was noted so all The abdominal instruments were removed.  The incisions were closed with interrupted stitches of 4-0 Vicryl suture. Sterile Band-Aids were placed.  The rodriguez catheter was then removed and the cystoscopy was placed and the entire bladder appeared normal with BL ureter jets noted to be effluxing pyridium stained urine. The specimen was sent to pathology.  Sponge and needle counts were correct. The patient tolerated the procedure well and was taken to the recovery room in good condition. There were no complications.    Carine Cervantes DO

## 2017-04-24 NOTE — PROGRESS NOTES
Admission medication history interview status for the 4/24/2017  admission is complete. See EPIC admission navigator for prior to admission medications     Medication history source reliability:Good    Medication history interview source(s):Patient    Medication history resources (including written lists, pill bottles, clinic record):mail    Primary pharmacy.[facility]    Additional medication history information not noted on PTA med list :    Time spent in this activity: 60 minutes  Prior to Admission medications    Medication Sig Last Dose Taking? Auth Provider   Nutritional Supplements (DHEA PO) Take 25 mg by mouth daily 4/21/2017 at am Yes Reported, Patient   NAPROXEN PO Take 250 mg by mouth 2 times daily 4/21/2017 at pm Yes Reported, Patient   Fish Oil-Cholecalciferol (FISH OIL-VITAMIN D PO) Take 4 capsules by mouth daily 4/21/2017 at pm Yes Reported, Patient   Progesterone Micronized (PROGESTERONE PO) Take 200 mg by mouth daily 4/23/2017 at pm Yes Reported, Patient   NONFORMULARY 15 mg by IMPLANT route every 4 months Implantable Pellet: Estradiol December 2016 Yes Reported, Patient   NONFORMULARY 100 mg by IMPLANT route every 4 months Implantable Pellet: Testosterone December 2016 Yes Reported, Patient   NONFORMULARY Take 1 tablet by mouth 2 times daily Hair Repair  Contains:   5000mcg Biotin  25mg Zinc  75mg Selenium  600mg Saw Palmetto 4/21/2017 Yes Reported, Patient   Carboxymethylcellulose Sodium (REFRESH TEARS OP) Place 1 drop into both eyes daily as needed more than a week at prn Yes Reported, Patient   Levothyroxine Sodium (SYNTHROID PO) Take 75 mcg by mouth daily 4/24/2017 at 0445 Yes Reported, Patient   Liothyronine Sodium (CYTOMEL PO) Take 10 mcg by mouth daily 4/24/2017 at 0445 Yes Reported, Patient   Nutritional Supplements (NUTRITIONAL SUPPLEMENT PO) Take 9 tablets by mouth daily Formulated Supplement  Contains:  200mg Alpha Lipoic Acid  25mg B6  2500iu Beta Carotene  400mcg Biotin  30mg  CoQ10  400mg Magnesium Glycinate  3mg Manganese Gluconate  1.2mg Methyl Folate  1000mcg Methylcobalamin  75mcg Molybdenum  25mg Niacin  25mg Niacinamide  25mg P5P  25mg R5P  25mg Riboflavin  50mg Thiamine  2500iu Vitamin A  200iu Vitamin D  5mg Zinc Glycinate  5mg Zinc Picolinate 4/21/2017 Yes Reported, Patient   clobetasol (TEMOVATE) 0.05 % cream Apply topically 2 times daily  4/23/2017 at pm Yes Reported, Patient   Probiotic Product (PROBIOTIC DAILY PO) Take 3 capsules by mouth daily  4/21/2017 Yes Reported, Patient

## 2017-04-24 NOTE — BRIEF OP NOTE
[facility] Brief Operative Note    Pre-operative diagnosis: ENLARGED LABIA, POST MENOPASUAL BLEEDING, UTERINE FIBROIDS   Post-operative diagnosis same   Procedure: Procedure(s):  TOTAL LAPAROSCOPIC HYSTERECTOMY, Cystoscopy, LABIAPLASTY Bilateral - Wound Class: II-Clean Contaminated   - Wound Class: I-Clean   - Wound Class: II-Clean Contaminated   Surgeon(s): Surgeon(s) and Role:  Panel 1:     * Fern Louie MD - Primary     * Carine Cervantes DO - Assisting     * Bhavesh Elmore MD - Assisting    Panel 2:     * Fern Louie MD - Primary     * Carine Cervantes DO - Assisting     * Bhavesh Elmore MD - Assisting   Estimated blood loss: 125 mL    Specimens:   ID Type Source Tests Collected by Time Destination   A : Uterus and Cervix Tissue Uterus and Cervix SURGICAL PATHOLOGY EXAM Fern Louie MD 4/24/2017  9:40 AM    B : Labial Skin Tissue Other SURGICAL PATHOLOGY EXAM Fern Louie MD 4/24/2017 11:30 AM       Findings: Uterus 8wks size with multiple fibroids, required coring of uterus to remove it, no endo or adhesions; absent adnexae; normal bladder interior by cystoscopy; closed cuff laparoscopically; closed angles of cuff vaginally as well; bilaterally enlarged labia, s/p labioplasty and elliptical excision of excess periclitoral tissue  Drains rodriguez  Complications none  Pt tolerated well./Abdulkadir

## 2017-04-24 NOTE — PLAN OF CARE
Problem: Goal Outcome Summary  Goal: Goal Outcome Summary  Outcome: No Change  Patient A&O, slightly sleepy, VSS on RA, tolerating clear liquids, IV infusing, arrived on unit @1430, not OOB at this time, Tavarez in place and draining orange/olimpia colored urine (patient received Pyridium in pre-op). Patient denies pain, no N/V, scop patch in place behind R ear. CMS+, BS hypoactive, not passing flatus, frequent belching noted. 4 abdominal lap sites CDI, labia incision KIERRA, swollen, ice in place. Some scant vaginal bleeding noted on pad. Patient's sister at bedside and supportive. Will continue to monitor.

## 2017-04-25 VITALS
RESPIRATION RATE: 16 BRPM | OXYGEN SATURATION: 98 % | HEIGHT: 61 IN | WEIGHT: 148.5 LBS | DIASTOLIC BLOOD PRESSURE: 61 MMHG | SYSTOLIC BLOOD PRESSURE: 128 MMHG | BODY MASS INDEX: 28.04 KG/M2 | TEMPERATURE: 99.3 F

## 2017-04-25 LAB
COPATH REPORT: NORMAL
HGB BLD-MCNC: 11.3 G/DL (ref 11.7–15.7)

## 2017-04-25 PROCEDURE — 85018 HEMOGLOBIN: CPT | Performed by: OBSTETRICS & GYNECOLOGY

## 2017-04-25 PROCEDURE — 25000132 ZZH RX MED GY IP 250 OP 250 PS 637: Performed by: OBSTETRICS & GYNECOLOGY

## 2017-04-25 PROCEDURE — 40000934 ZZH STATISTIC OUTPATIENT (NON-OBS) DAY

## 2017-04-25 PROCEDURE — 36415 COLL VENOUS BLD VENIPUNCTURE: CPT | Performed by: OBSTETRICS & GYNECOLOGY

## 2017-04-25 PROCEDURE — 25800025 ZZH RX 258: Performed by: OBSTETRICS & GYNECOLOGY

## 2017-04-25 PROCEDURE — 25000128 H RX IP 250 OP 636: Performed by: OBSTETRICS & GYNECOLOGY

## 2017-04-25 PROCEDURE — S5010 5% DEXTROSE AND 0.45% SALINE: HCPCS | Performed by: OBSTETRICS & GYNECOLOGY

## 2017-04-25 RX ORDER — OXYCODONE HYDROCHLORIDE 5 MG/1
5-10 TABLET ORAL
Qty: 10 TABLET | Refills: 0 | Status: SHIPPED | OUTPATIENT
Start: 2017-04-25 | End: 2019-05-17

## 2017-04-25 RX ADMIN — KETOROLAC TROMETHAMINE 30 MG: 30 INJECTION, SOLUTION INTRAMUSCULAR at 00:15

## 2017-04-25 RX ADMIN — DEXTROSE AND SODIUM CHLORIDE: 5; 450 INJECTION, SOLUTION INTRAVENOUS at 02:43

## 2017-04-25 RX ADMIN — LEVOTHYROXINE SODIUM 75 MCG: 75 TABLET ORAL at 07:10

## 2017-04-25 RX ADMIN — LIOTHYRONINE SODIUM 10 MCG: 5 TABLET ORAL at 07:10

## 2017-04-25 RX ADMIN — KETOROLAC TROMETHAMINE 30 MG: 30 INJECTION, SOLUTION INTRAMUSCULAR at 07:10

## 2017-04-25 NOTE — PLAN OF CARE
Problem: Goal Outcome Summary  Goal: Goal Outcome Summary  Outcome: Improving    Pt alert and oriented x4, vss, RA. Pain managed with iv toradol, Assist of 1, rodriguez removed, she has urinated, saline locked. Small drainage noted from [place] area, abdominal dressing is CDI. Progressing per POC, continue to monitor.Plan to dc today

## 2017-04-25 NOTE — PLAN OF CARE
Problem: Goal Outcome Summary  Goal: Goal Outcome Summary  Outcome: Improving  A/O, VSS ex bradycardic in 50s. Lap sites x4 CDI and covered with bandaids. Sm amt bright blood vaginal drainage. Labial edema, incision unable to be visualized. Ice to site. Pt denies pain.   cc/hr. Dr Louie updated over phone. Pt initially with low UOP per rodriguez, received orders to give 500cc NS bolus x1. Urine output now improved over last couple hours after pt more alert and drinking fluids, received order from on-call physician to discontinue bolus. Plan to remove rodriguez catheter at 0600. Dr Louie will see pt around 0700. Tolerating regular diet, up walking on unit with SBA. D/c home tomorrow.

## 2017-04-25 NOTE — PLAN OF CARE
Problem: Hysterectomy (Adult)  Goal: Signs and Symptoms of Listed Potential Problems Will be Absent or Manageable (Hysterectomy)  Signs and symptoms of listed potential problems will be absent or manageable by discharge/transition of care (reference Hysterectomy (Adult) CPG).  Outcome: No Change  A/O, VSS ex bradycardic in 50s. Lap sites x4 CDI and covered with bandaids. Sm amt bright blood vaginal drainage. Labial edema, incision  Visualized has stiches Ice to site. Pt denies pain.  cc/hr. UOP large amounts per rodriguez.  Plan to remove rodriguez catheter at 0600. Dr Louie will see pt around 0700. Tolerating regular diet, up walking on unit with SBA. D/c home tomorrow.

## 2017-04-25 NOTE — PLAN OF CARE
Problem: Goal Outcome Summary  Goal: Goal Outcome Summary  Outcome: Adequate for Discharge Date Met:  04/25/17  VSS. A/O. Pain minimal. Declines pain medication.  Incisions WNL. Voiding no issues post rodriguez removal. Tolerating regular diet. DC instructions reviewed with patient. DC script given to patient and reviewed including side effects/administration.  All questions answered. Patient verbalizes understanding of DC care. Waiting to be picked up by sister.

## 2017-04-25 NOTE — PROGRESS NOTES
"POD 1  S.  Doing well.  No N/V.  Minimal pain, only toradol for pain.  O.  AVSS  /62 (BP Location: Left arm)  Temp 98.1  F (36.7  C) (Oral)  Resp 16  Ht 1.549 m (5' 1\")  Wt 67.4 kg (148 lb 8 oz)  SpO2 96%  BMI 28.06 kg/m2    Intake/Output Summary (Last 24 hours) at 04/25/17 0703  Last data filed at 04/25/17 0657   Gross per 24 hour   Intake             5295 ml   Output             5400 ml   Net             -105 ml   Abd soft, incisions clean and intact  Bilateral labia with edema  Tavarez catheter removed by the nurse while I was in the room    Recent Labs  Lab 04/25/17  0610   HGB 11.3*   A.  POD 1 s/p TLH, bilateral labioplasty.  Appropriate recovery  P.  Home today.  Make sure can void.  Ice packs to perineum./LBakerMD  "

## 2017-04-25 NOTE — DISCHARGE INSTRUCTIONS
Discharge Instructions following Vaginal or Abdominal Hysterectomy    Diet:    You may feel less hungry at first.  Try to eat a well balanced diet with lots of proteins, fruits, vegetables, and whole grains.  Avoid spicy and greasy foods.    Drink plenty of fluids - at least 8 tall glasses a day.  Water is best.  Try to limit caffeine (found in coffee, tea, and cola drinks).  This helps prevent constipation (hard stools that are difficult to pass).    If constipation is a problem, consider one of these medicines: docusate (Colace), docusate with casanthranol (Nanci-Colace), psyllium (Metamucil) or milk of magnesia.  You can buy these at the drug store.  Follow the instructions listed on the label.  Activity:    You will need plenty of rest at first.  Slowly go back to your normal activities.  It will help to take several short walks during the day.  It is ok to climb stairs, but use the handrail in case you get dizzy.    Do not drive while using strong pain medications (narcotics).  After that, do not drive until you can stomp on the brakes without pain or flinching.      Do not use tampons, douche, or have sex (intercourse) until you see your doctor.    Don t lift or push anything over 15 lbs until your doctor says it s safe.  Avoid heavy or strenuous exercise.    You may start gentle exercises after two weeks.    Listen to your body.  If you feel tired or have backaches, you may be doing too much too quickly.  Pain control:    You pain should improve over the next 2-3 weeks.  If you have increased pain, please call the clinic.  It is normal to see a little sore after mild exercise.    Always take your pain medicine as directed by your doctor.  Drink a full glass of water with each dose.      Caring for your incision:    You may see a small amount of fluid draining from your incision (cut).  This is normal.  You may wear a bandage until the drainage stops.    Keep the area clean and dry.  Do not use lotions,  powders, or perfumes on the site.    If present, Steri-Strips (small, white tape) may fall off on their own.  If not, remove them after 7 days.    If present, staples will be removed at your next visit.    If present, Dermabond (medical glue used on the skin) will wear off on its own, do not peel it off.  Bathing    Use care to avoid slips and falls.  Gently pat your incisions dry after bathing.    After abdominal hysterectomy (through the belly): you may shower.  Avoid tub baths and swimming for two weeks, or until your cuts heal.    After vaginal hysterectomy (surgery through the vagina): you may bathe or shower as usual.  If you had surgery to repair the vaginal wall, it may help to soak in a warm bath for 20 minutes twice daily.  This will speed healing and reduce tenderness.    If you have a catheter (tube in your bladder) - shower only.  Normal Symptoms:    You may notice a small amount of bleeding or fluid coming from your vagina.  This could last for several weeks.  Wear pads as needed.    You may see stitches poking out of the vagina.  You may also see stitches pass through the vagina (they will look like tiny threads).  Both of these are normal.  Most stitches will dissolve within three months.    The area around your stitches may feel numb.  This should go away in less than a year.    After surgery, it is common to feel dizzy or lightheaded at times.  You might also have hot flashes, trouble sleeping, sudden mood swings and irritability.  If any of these symptoms become a problem, please call your doctor s office.  If you had a vaginal repair, you may feel tugging or pulling in the vagina.  This is a normal part of healing.  Hormones:    If we removed your ovaries, you may need hormone medicine (HT, or hormone therapy).  Discuss this with your doctor.  If we did not remove your ovaries, you should reach menopause at the normal time (in general, between ages 40 and 55).        Notify your surgeon for the  following signs and symptoms:    Severe chills and temperature of 100.4 oF or greater, taken under the tongue.    Bright red blood or large clots coming out of the vagina - enough to soak one pad (sanitary napkin) per hour.    Increased pain, warmth, swelling, redness at surgery site.    Foul smelling, green/yellow discharge from incision.    Urine or vaginal fluid that smells bad.    You cannot urinate (use the toilet), it burns when you urinate, or you need to go more often.    Severe nausea (feeling sick to your stomach) or vomiting (throwing up).    Increased pain that you cannot control with medicine.    Dustin pain and swelling in one or both legs.    Any questions or concerns.

## 2017-05-01 NOTE — OP NOTE
DATE OF PROCEDURE:  04/24/2017       PREOPERATIVE DIAGNOSES:   1.  Postmenopausal bleeding.   2.  Uterine fibroids.     3.  Status post total laparoscopic hysterectomy with Dr. Louie and Dr. Cervantes.   4.  Enlarged labia, symptomatic.      POSTOPERATIVE DIAGNOSES:   1.  Postmenopausal bleeding.   2.  Uterine fibroids.     3.  Status post total laparoscopic hysterectomy with Dr. Louie and Dr. Cervantes.   4.  Enlarged labia, symptomatic.      PROCEDURE:     1.  Total laparoscopic hysteroscopy, cystoscopy by Dr. Louie and Dr. Cervantes.     2.  Bilateral labioplasty by Dr. Louie and Dr. Elmore with elliptical excision of excess periclitoral tissue.      ANESTHESIA:  General.      SURGICAL FINDINGS:   1.  See [facility] dictation by Dr. Carine Cervantes.  The uterus required coring during that procedure to remove it.  There was no evidence of endometriosis or pelvic adhesions.  We closed the cuff laparoscopically and also closed part of the cuff vaginally as well, especially the angles.   2.  Bilaterally enlarged labia requiring labioplasty and an elliptical excision of excess periclitoral tissue.       DESCRIPTION OF PROCEDURE:  Joyce Ortiz was taken to the operating room and appropriately prepped and draped in the dorsal lithotomy position.  Dr. Cervantes and I proceeded with a laparoscopic hysterectomy and cystoscopy.  During that procedure, we cored the uterus and we also closed the vaginal cuff, both laparoscopically and vaginally.  Please see Dr. Cervantes's dictation.  After that part of the procedure, our attention was turned to her labia.  At this point, Dr. Cervantes left the procedure and Dr. Bhavesh Elmore entered the procedure.        I used a marking pen to hussain where I was going to remove the excess labia.  This was done bilaterally.  Then using a scalpel, the labia with skimmed, and we started on the left side.  Using a Metzenbaum scissors, I went right over the incision from the scalpel and removed the excess  labial tissue.  There was quite a bit of redundancy, and using Metzenbaum scissors, I needed to trim some other areas to make it look right.  Her labia also went down to the introital area, and this area was trimmed a little bit as well.  This process was repeated on the right side.  At this point, interrupted sutures of 3-0 Vicryl were placed, and these were numerous given the extensiveness of the labioplasty.  After all these sutures were placed, there seemed to be an extra fold of tissue on the right side of the clitoris, so using Metzenbaum scissors, this fold was elevated and then removed/excised with Metzenbaum scissors, and then interrupted sutures of 3-0 Vicryl were placed in this area as well.  At the end of the procedure, I was very pleased with the outcome.  Tavarez catheter was then placed, and ice pack was applied to the perineum.      ESTIMATED BLOOD LOSS:  25 mL for the TLH and 100 mL for the labioplasty.      DRAINS:  Tavarez.      COMPLICATIONS:  None.      The patient tolerated the procedure well and was taken to the recovery room in good condition.         OLIMPIA PAUL MD             D: 2017 06:38   T: 2017 08:55   MT: [id number]      Name:     LINCOLN MONZON   MRN:      [id number]        Account:        [id number]   :      1955           Procedure Date: 2017      Document: [id number]

## 2017-08-12 ENCOUNTER — HEALTH MAINTENANCE LETTER (OUTPATIENT)
Age: 62
End: 2017-08-12

## 2017-11-06 ENCOUNTER — OFFICE VISIT (OUTPATIENT)
Dept: DERMATOLOGY | Facility: CLINIC | Age: 62
End: 2017-11-06

## 2017-11-06 VITALS — SYSTOLIC BLOOD PRESSURE: 125 MMHG | HEART RATE: 70 BPM | DIASTOLIC BLOOD PRESSURE: 78 MMHG

## 2017-11-06 DIAGNOSIS — C84.A0 PRIMARY CUTANEOUS ANAPLASTIC LARGE T-CELL LYMPHOMA (H): Primary | ICD-10-CM

## 2017-11-06 RX ORDER — ESTRADIOL 0.04 MG/D
PATCH, EXTENDED RELEASE TRANSDERMAL
COMMUNITY
Start: 2017-06-01 | End: 2019-05-17

## 2017-11-06 RX ORDER — TESTOSTERONE 25 MG/2.5G
1 GEL TRANSDERMAL
COMMUNITY
Start: 2015-10-28 | End: 2022-08-11

## 2017-11-06 ASSESSMENT — PAIN SCALES - GENERAL: PAINLEVEL: NO PAIN (0)

## 2017-11-06 NOTE — MR AVS SNAPSHOT
After Visit Summary   11/6/2017    Joyce Ortiz    MRN: [id number]           Patient Information     Date Of Birth          1955        Visit Information        Provider Department      11/6/2017 11:30 AM Nani Abdullahi MD [facility] Dermatology         Follow-ups after your visit        Who to contact     Please call your clinic at [phone] to:    Ask questions about your health    Make or cancel appointments    Discuss your medicines    Learn about your test results    Speak to your doctor   If you have compliments or concerns about an experience at your clinic, or if you wish to file a complaint, please contact [facility] Patient Relations at [phone] or email us at [email or web address]         Additional Information About Your Visit        MyChart Information     [employer] gives you secure access to your electronic health record. If you see a primary care provider, you can also send messages to your care team and make appointments. If you have questions, please call your primary care clinic.  If you do not have a primary care provider, please call [phone] and they will assist you.      [employer] is an electronic gateway that provides easy, online access to your medical records. With [employer], you can request a clinic appointment, read your test results, renew a prescription or communicate with your care team.     To access your existing account, please contact your [facility] Clinic or call [phone] for assistance.        Care EveryWhere ID     This is your Care EveryWhere ID. This could be used by other organizations to access your [place] medical records  [id number]        Your Vitals Were     Pulse                   70            Blood Pressure from Last 3 Encounters:   11/06/17 125/78   04/25/17 128/61   11/07/16 110/73    Weight from Last 3 Encounters:   04/24/17 67.4 kg (148 lb 8 oz)   05/11/15 65.5 kg  (144 lb 6.4 oz)   09/22/14 65.9 kg (145 lb 3.2 oz)              Today, you had the following     No orders found for display       Primary Care Provider Office Phone # Fax #    Christina Vargas [phone]       [facility] [address]  [place] [address]        Equal Access to Services     [facility]: Hadii aad ku hadasho Soomaali, waaxda luqadaha, qaybta kaalmada adeegyada, waxay idiin hayaan adeeg dakota la'aan . So [place] [phone].    ATENCIÓN: Si jhonatan preciado, tiene a ellis disposición servicios gratuitos de asistencia lingüística. Haliame al [phone].    We comply with applicable federal civil rights laws and [place] laws. We do not discriminate on the basis of race, color, national origin, age, disability, sex, sexual orientation, or gender identity.            Thank you!     Thank you for choosing [facility] DERMATOLOGY  for your care. Our goal is always to provide you with excellent care. Hearing back from our patients is one way we can continue to improve our services. Please take a few minutes to complete the written survey that you may receive in the mail after your visit with us. Thank you!             Your Updated Medication List - Protect others around you: Learn how to safely use, store and throw away your medicines at www.disposemymeds.org.          This list is accurate as of: 11/6/17 12:18 PM.  Always use your most recent med list.                   Brand Name Dispense Instructions for use Diagnosis    ANDROGEL 1% 25 MG/2.5GM (1%) gel   Generic drug:  testosterone      1 packet        cholecalciferol 1000 UNIT tablet    vitamin D3          clobetasol 0.05 % cream    TEMOVATE     Apply topically as needed        CYTOMEL PO      Take 10 mcg by mouth daily        DHEA PO      Take 25 mg by mouth daily        estradiol 0.0375 MG/24HR BIW patch    VIVELLE-DOT     KAMILLA ONE PATCH TWICE WEEKLY FOR 90 DAYS        FISH OIL-VITAMIN D PO      Take 4 capsules by mouth  daily        NAPROXEN PO      Take 250 mg by mouth 2 times daily        * NONFORMULARY      15 mg by IMPLANT route every 4 months Implantable Pellet: Estradiol        * NONFORMULARY      100 mg by IMPLANT route every 4 months Implantable Pellet: Testosterone        * NONFORMULARY      Take 1 tablet by mouth 2 times daily Hair Repair Contains:  5000mcg Biotin 25mg Zinc 75mg Selenium 600mg Saw Palmetto        NUTRITIONAL SUPPLEMENT PO      Take 9 tablets by mouth daily Formulated Supplement Contains: 200mg Alpha Lipoic Acid 25mg B6 2500iu Beta Carotene 400mcg Biotin 30mg CoQ10 400mg Magnesium Glycinate 3mg Manganese Gluconate 1.2mg Methyl Folate 1000mcg Methylcobalamin 75mcg Molybdenum 25mg Niacin 25mg Niacinamide 25mg P5P 25mg R5P 25mg Riboflavin 50mg Thiamine 2500iu Vitamin A 200iu Vitamin D 5mg Zinc Glycinate 5mg Zinc Picolinate        oxyCODONE IR 5 MG tablet    ROXICODONE    10 tablet    Take 1-2 tablets (5-10 mg) by mouth every 3 hours as needed for moderate to severe pain    S/P laparoscopic hysterectomy       PROBIOTIC DAILY PO      Take 3 capsules by mouth daily    Mycosis fungoides (H)       PROGESTERONE PO      Take 200 mg by mouth daily        REFRESH TEARS OP      Place 1 drop into both eyes daily as needed        SYNTHROID PO      Take 75 mcg by mouth daily        * Notice:  This list has 3 medication(s) that are the same as other medications prescribed for you. Read the directions carefully, and ask your doctor or other care provider to review them with you.

## 2017-11-06 NOTE — NURSING NOTE
Dermatology Rooming Note    Joyce Ortiz's goals for this visit include:   Chief Complaint   Patient presents with     Derm Problem     Joyce is here for a T cell skin check.  States she has a concerning area on her scalp and near her left eye.       Adriana Malin LPN

## 2017-11-06 NOTE — PROGRESS NOTES
[facility] Dermatology Note      Dermatology Problem List:  1. 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     2. Lichen sclerosus et atrophicus managed by OB/GYN.      3. Melanoma in situ per history on the right upper chest and left upper back, biopsied 06/09/2015.     4. Melanoma (lentigo maligna) on the left zygoma, treated with surgery by Dr. Alex Alejandre.     5. Sensitivity of facial skin to products pending patch testing managed by patient's private dermatologist.    Encounter Date: Nov 6, 2017    CC:  Chief Complaint   Patient presents with     Derm Problem     Joyce is here for a T cell skin check.  States she has a concerning area on her scalp and near her left eye.         History of Present Illness:  Ms. 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 The patient was last seen 11/2016 when she had no concerning lesions noted by Dr. Abdullahi. There have been no new red bumps, lesions, or swelling of concern. The patient does have some residual tingling in the antecubital area. Since her last visit, the patient has had a skin tag treated with LN at her regular dermatologist. She has also been undergoing blue light therapy for precancerous lesions on her forehead.     Past Medical History:   Patient Active Problem List   Diagnosis     Cutaneous T-cell lymphoma (H)     Cutaneous CD30+ lymphoproliferative disorder (H)     History of melanoma     Lichen sclerosus et atrophicus     S/P laparoscopic hysterectomy     Past Medical History:   Diagnosis Date     Basal cell carcinoma      Cutaneous T-cell lymphoma (H)      Gastroesophageal reflux disease      Heart murmur     faint     Hypertension      Hypothyroidism  (acquired)      Motion sickness      PONV (postoperative nausea and vomiting)      Past Surgical History:   Procedure Laterality Date     ARTHROSCOPY SHOULDER Right 1990    Dr. Calhoun     BIOPSY OF SKIN LESION       BONE MARROW ASPIRATION AND EXAM       COLONOSCOPY       CYSTOSCOPY N/A 4/24/2017    Procedure: CYSTOSCOPY;;  Surgeon: Fern Louie MD;  Location: [facility] OR     EYE SURGERY      cataract surgery bilateral     GYN SURGERY      bartholin cyst     HC KNEE SCOPE,MED/LAT MENISCUS REPAIR Left 7/1/10    Dr. Souza     HC REDUCTION OF LARGE BREAST  1978/1998    Dr. Downing/Dr. Campa     HYSTEROSCOPY      D&C     LABIALPLASTY  4/24/2017    Procedure: LABIAPLASTY;;  Surgeon: Fern Louie MD;  Location: [facility] OR     LAPAROSCOPIC HYSTERECTOMY TOTAL N/A 4/24/2017    Procedure: LAPAROSCOPIC HYSTERECTOMY TOTAL;  TOTAL LAPAROSCOPIC HYSTERECTOMY, Cystoscopy, LABIAPLASTY Bilateral;  Surgeon: Fern Louie MD;  Location: [facility] OR     OOPHORECTOMY  3/26/10    Dr. Louie     ORTHOPEDIC SURGERY Right 2016    right knee arthroscopy       Family History:  There is sister with history of melanoma.    Medications:  Current Outpatient Prescriptions   Medication Sig Dispense Refill     estradiol (VIVELLE-DOT) 0.0375 MG/24HR BIW patch KAMILLA ONE PATCH TWICE WEEKLY FOR 90 DAYS       testosterone (ANDROGEL 1%) 25 MG/2.5GM (1%) gel 1 packet       cholecalciferol (VITAMIN D3) 1000 UNIT tablet        Nutritional Supplements (DHEA PO) Take 25 mg by mouth daily       NAPROXEN PO Take 250 mg by mouth 2 times daily       Fish Oil-Cholecalciferol (FISH OIL-VITAMIN D PO) Take 4 capsules by mouth daily       Progesterone Micronized (PROGESTERONE PO) Take 200 mg by mouth daily       NONFORMULARY Take 1 tablet by mouth 2 times daily Hair Repair  Contains:   5000mcg Biotin  25mg Zinc  75mg Selenium  600mg Saw Palmetto       Carboxymethylcellulose Sodium (REFRESH TEARS OP) Place 1 drop into both eyes daily as needed       Levothyroxine Sodium  (SYNTHROID PO) Take 75 mcg by mouth daily       Liothyronine Sodium (CYTOMEL PO) Take 10 mcg by mouth daily       Nutritional Supplements (NUTRITIONAL SUPPLEMENT PO) Take 9 tablets by mouth daily Formulated Supplement  Contains:  200mg Alpha Lipoic Acid  25mg B6  2500iu Beta Carotene  400mcg Biotin  30mg CoQ10  400mg Magnesium Glycinate  3mg Manganese Gluconate  1.2mg Methyl Folate  1000mcg Methylcobalamin  75mcg Molybdenum  25mg Niacin  25mg Niacinamide  25mg P5P  25mg R5P  25mg Riboflavin  50mg Thiamine  2500iu Vitamin A  200iu Vitamin D  5mg Zinc Glycinate  5mg Zinc Picolinate       clobetasol (TEMOVATE) 0.05 % cream Apply topically as needed        Probiotic Product (PROBIOTIC DAILY PO) Take 3 capsules by mouth daily        oxyCODONE (ROXICODONE) 5 MG IR tablet Take 1-2 tablets (5-10 mg) by mouth every 3 hours as needed for moderate to severe pain (Patient not taking: Reported on 11/6/2017) 10 tablet 0     NONFORMULARY 15 mg by IMPLANT route every 4 months Implantable Pellet: Estradiol       NONFORMULARY 100 mg by IMPLANT route every 4 months Implantable Pellet: Testosterone       Allergies   Allergen Reactions     Clindamycin Rash     Rash covered entire body.     Latex Itching     Seasonal Allergies      Erythromycin Rash         Review of Systems:  -Skin Establ Pt: The patient denies any new rash, pruritus, or lesions that are symptomatic, changing or bleeding, except as per HPI.  -Constitutional: The patient denies fatigue, fevers, chills, unintended weight loss, and night sweats. Low energy levels  -Skin: As above in HPI. No additional skin concerns.    Physical exam:  Vitals: /78 (BP Location: Right arm, Patient Position: Chair, Cuff Size: Adult Regular)  Pulse 70  GEN: This is a well developed, well-nourished female in no acute distress, in a pleasant mood.    SKIN: Total skin excluding the undergarment areas was performed. The exam included the head/face, neck, both arms, chest, back, abdomen,  both legs, buttocks, digits and/or nails.   -Post-excision scarring on posterior left shoulder  -Small, skin colored flat papule on lateral aspect of left nasal bridge  -Chronic sun damage on forehead along hairline  -Well healed scar on left antecubital fossa. Diminished sensation to touch in area.  -No other lesions of concern on areas examined.   LYMPH: No lymphadenopathy palpated in the cervical, axillary, inguinal chains    Impression/Plan:  1. 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 S/p wide local excision in 2014 with no evidence of recurrence. No lymphadenopathy or B symptoms today.     Continue to do yearly screening exams    2. History of melanoma in situ, no clinical evidence of recurrence    Will continue to follow    Patient will continue to follow with her primary dermatologist for this as well.    CC Dr. Christina Vargas, and Dr. Brooklyn Diaz on close of this encounter.  Follow-up in 1 year, earlier for new or changing lesions.     Staff Involved:  Scribed by Enrique Simpson, MS4 for Dr. Nani Abdullahi.      .The medical student acted as scribe for this encounter.  The encounter documented above was completely performed by myself.  Nani Abdullahi MD

## 2017-11-06 NOTE — LETTER
11/6/2017       RE: Joyec Ortiz  [address] [place] [address]     Dear Colleague,    Thank you for referring your patient, Joyce Ortiz, to the [facility] DERMATOLOGY at [facility]. Please see a copy of my visit note below.    [facility] Dermatology Note      Dermatology Problem List:  1. 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     2. Lichen sclerosus et atrophicus managed by OB/GYN.      3. Melanoma in situ per history on the right upper chest and left upper back, biopsied 06/09/2015.     4. Melanoma (lentigo maligna) on the left zygoma, treated with surgery by Dr. Alex Alejandre.     5. Sensitivity of facial skin to products pending patch testing managed by patient's private dermatologist.    Encounter Date: Nov 6, 2017    CC:  Chief Complaint   Patient presents with     Derm Problem     Joyce is here for a T cell skin check.  States she has a concerning area on her scalp and near her left eye.         History of Present Illness:  Ms. 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 The patient was last seen 11/2016 when she had no concerning lesions noted by Dr. Abdullahi. There have been no new red bumps, lesions, or swelling of concern. The patient does have some residual tingling in the antecubital area. Since her last visit, the patient has had a skin tag treated with LN at her regular dermatologist. She has also been undergoing blue light therapy for precancerous lesions on her forehead.     Past Medical History:   Patient Active Problem List   Diagnosis     Cutaneous T-cell lymphoma (H)     Cutaneous CD30+ lymphoproliferative disorder (H)     History of melanoma      Lichen sclerosus et atrophicus     S/P laparoscopic hysterectomy     Past Medical History:   Diagnosis Date     Basal cell carcinoma      Cutaneous T-cell lymphoma (H)      Gastroesophageal reflux disease      Heart murmur     faint     Hypertension      Hypothyroidism (acquired)      Motion sickness      PONV (postoperative nausea and vomiting)      Past Surgical History:   Procedure Laterality Date     ARTHROSCOPY SHOULDER Right 1990    Dr. Calhoun     BIOPSY OF SKIN LESION       BONE MARROW ASPIRATION AND EXAM       COLONOSCOPY       CYSTOSCOPY N/A 4/24/2017    Procedure: CYSTOSCOPY;;  Surgeon: Fern Louie MD;  Location: [facility] OR     EYE SURGERY      cataract surgery bilateral     GYN SURGERY      bartholin cyst     HC KNEE SCOPE,MED/LAT MENISCUS REPAIR Left 7/1/10    Dr. Souza     HC REDUCTION OF LARGE BREAST  1978/1998    Dr. Downing/Dr. aCmpa     HYSTEROSCOPY      D&C     LABIALPLASTY  4/24/2017    Procedure: LABIAPLASTY;;  Surgeon: Fern Louie MD;  Location: [facility] OR     LAPAROSCOPIC HYSTERECTOMY TOTAL N/A 4/24/2017    Procedure: LAPAROSCOPIC HYSTERECTOMY TOTAL;  TOTAL LAPAROSCOPIC HYSTERECTOMY, Cystoscopy, LABIAPLASTY Bilateral;  Surgeon: Fern Louie MD;  Location: [facility] OR     OOPHORECTOMY  3/26/10    Dr. Louie     ORTHOPEDIC SURGERY Right 2016    right knee arthroscopy       Family History:  There is sister with history of melanoma.    Medications:  Current Outpatient Prescriptions   Medication Sig Dispense Refill     estradiol (VIVELLE-DOT) 0.0375 MG/24HR BIW patch KAMILLA ONE PATCH TWICE WEEKLY FOR 90 DAYS       testosterone (ANDROGEL 1%) 25 MG/2.5GM (1%) gel 1 packet       cholecalciferol (VITAMIN D3) 1000 UNIT tablet        Nutritional Supplements (DHEA PO) Take 25 mg by mouth daily       NAPROXEN PO Take 250 mg by mouth 2 times daily       Fish Oil-Cholecalciferol (FISH OIL-VITAMIN D PO) Take 4 capsules by mouth daily       Progesterone Micronized (PROGESTERONE PO) Take 200 mg by mouth  daily       NONFORMULARY Take 1 tablet by mouth 2 times daily Hair Repair  Contains:   5000mcg Biotin  25mg Zinc  75mg Selenium  600mg Saw Palmetto       Carboxymethylcellulose Sodium (REFRESH TEARS OP) Place 1 drop into both eyes daily as needed       Levothyroxine Sodium (SYNTHROID PO) Take 75 mcg by mouth daily       Liothyronine Sodium (CYTOMEL PO) Take 10 mcg by mouth daily       Nutritional Supplements (NUTRITIONAL SUPPLEMENT PO) Take 9 tablets by mouth daily Formulated Supplement  Contains:  200mg Alpha Lipoic Acid  25mg B6  2500iu Beta Carotene  400mcg Biotin  30mg CoQ10  400mg Magnesium Glycinate  3mg Manganese Gluconate  1.2mg Methyl Folate  1000mcg Methylcobalamin  75mcg Molybdenum  25mg Niacin  25mg Niacinamide  25mg P5P  25mg R5P  25mg Riboflavin  50mg Thiamine  2500iu Vitamin A  200iu Vitamin D  5mg Zinc Glycinate  5mg Zinc Picolinate       clobetasol (TEMOVATE) 0.05 % cream Apply topically as needed        Probiotic Product (PROBIOTIC DAILY PO) Take 3 capsules by mouth daily        oxyCODONE (ROXICODONE) 5 MG IR tablet Take 1-2 tablets (5-10 mg) by mouth every 3 hours as needed for moderate to severe pain (Patient not taking: Reported on 11/6/2017) 10 tablet 0     NONFORMULARY 15 mg by IMPLANT route every 4 months Implantable Pellet: Estradiol       NONFORMULARY 100 mg by IMPLANT route every 4 months Implantable Pellet: Testosterone       Allergies   Allergen Reactions     Clindamycin Rash     Rash covered entire body.     Latex Itching     Seasonal Allergies      Erythromycin Rash         Review of Systems:  -Skin Establ Pt: The patient denies any new rash, pruritus, or lesions that are symptomatic, changing or bleeding, except as per HPI.  -Constitutional: The patient denies fatigue, fevers, chills, unintended weight loss, and night sweats. Low energy levels  -Skin: As above in HPI. No additional skin concerns.    Physical exam:  Vitals: /78 (BP Location: Right arm, Patient Position: Chair,  Cuff Size: Adult Regular)  Pulse 70  GEN: This is a well developed, well-nourished female in no acute distress, in a pleasant mood.    SKIN: Total skin excluding the undergarment areas was performed. The exam included the head/face, neck, both arms, chest, back, abdomen, both legs, buttocks, digits and/or nails.   -Post-excision scarring on posterior left shoulder  -Small, skin colored flat papule on lateral aspect of left nasal bridge  -Chronic sun damage on forehead along hairline  -Well healed scar on left antecubital fossa. Diminished sensation to touch in area.  -No other lesions of concern on areas examined.   LYMPH: No lymphadenopathy palpated in the cervical, axillary, inguinal chains    Impression/Plan:  1. 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 S/p wide local excision in 2014 with no evidence of recurrence. No lymphadenopathy or B symptoms today.     Continue to do yearly screening exams    2. History of melanoma in situ, no clinical evidence of recurrence    Will continue to follow    Patient will continue to follow with her primary dermatologist for this as well.    CC Dr. Christina Vargas, and Dr. Brooklyn Diaz on close of this encounter.  Follow-up in 1 year, earlier for new or changing lesions.     Staff Involved:  Scribed by Enrique Simpson, MS4 for Dr. Nani Abdullahi.      .The medical student acted as scribe for this encounter.  The encounter documented above was completely performed by myself.  Nani Abdullahi MD

## 2017-12-08 ENCOUNTER — HOSPITAL ENCOUNTER (OUTPATIENT)
Dept: MAMMOGRAPHY | Facility: CLINIC | Age: 62
Discharge: HOME | End: 2017-12-08
Attending: OBSTETRICS & GYNECOLOGY | Admitting: OBSTETRICS & GYNECOLOGY
Payer: COMMERCIAL

## 2017-12-08 DIAGNOSIS — Z12.31 VISIT FOR SCREENING MAMMOGRAM: ICD-10-CM

## 2017-12-08 PROCEDURE — G0202 SCR MAMMO BI INCL CAD: HCPCS

## 2018-11-26 ENCOUNTER — HOSPITAL ENCOUNTER (OUTPATIENT)
Dept: MAMMOGRAPHY | Facility: CLINIC | Age: 63
Discharge: HOME | End: 2018-11-26
Attending: OBSTETRICS & GYNECOLOGY | Admitting: OBSTETRICS & GYNECOLOGY
Payer: COMMERCIAL

## 2018-11-26 DIAGNOSIS — Z12.31 VISIT FOR SCREENING MAMMOGRAM: ICD-10-CM

## 2018-11-26 PROCEDURE — 77067 SCR MAMMO BI INCL CAD: CPT

## 2019-05-19 ENCOUNTER — ANESTHESIA EVENT (OUTPATIENT)
Dept: SURGERY | Facility: CLINIC | Age: 64
End: 2019-05-19
Payer: COMMERCIAL

## 2019-05-20 ENCOUNTER — ANESTHESIA (OUTPATIENT)
Dept: SURGERY | Facility: CLINIC | Age: 64
End: 2019-05-20
Payer: COMMERCIAL

## 2019-05-20 ENCOUNTER — APPOINTMENT (OUTPATIENT)
Dept: GENERAL RADIOLOGY | Facility: CLINIC | Age: 64
End: 2019-05-20
Attending: ORTHOPAEDIC SURGERY
Payer: COMMERCIAL

## 2019-05-20 ENCOUNTER — HOSPITAL ENCOUNTER (OUTPATIENT)
Facility: CLINIC | Age: 64
Discharge: HOME | End: 2019-05-20
Attending: ORTHOPAEDIC SURGERY | Admitting: ORTHOPAEDIC SURGERY
Payer: COMMERCIAL

## 2019-05-20 VITALS
BODY MASS INDEX: 31.19 KG/M2 | TEMPERATURE: 97.6 F | HEIGHT: 61 IN | OXYGEN SATURATION: 94 % | WEIGHT: 165.2 LBS | SYSTOLIC BLOOD PRESSURE: 136 MMHG | RESPIRATION RATE: 12 BRPM | DIASTOLIC BLOOD PRESSURE: 79 MMHG | HEART RATE: 61 BPM

## 2019-05-20 DIAGNOSIS — Z98.890 S/P BILATERAL FOOT SURGERY: Primary | ICD-10-CM

## 2019-05-20 PROCEDURE — 37000009 ZZH ANESTHESIA TECHNICAL FEE, EACH ADDTL 15 MIN: Performed by: ORTHOPAEDIC SURGERY

## 2019-05-20 PROCEDURE — 27210794 ZZH OR GENERAL SUPPLY STERILE: Performed by: ORTHOPAEDIC SURGERY

## 2019-05-20 PROCEDURE — 36000058 ZZH SURGERY LEVEL 3 EA 15 ADDTL MIN: Performed by: ORTHOPAEDIC SURGERY

## 2019-05-20 PROCEDURE — 27110028 ZZH OR GENERAL SUPPLY NON-STERILE: Performed by: ORTHOPAEDIC SURGERY

## 2019-05-20 PROCEDURE — 25000125 ZZHC RX 250: Performed by: ANESTHESIOLOGY

## 2019-05-20 PROCEDURE — 40000277 XR SURGERY CARM FLUORO LESS THAN 5 MIN W STILLS

## 2019-05-20 PROCEDURE — 25000566 ZZH SEVOFLURANE, EA 15 MIN: Performed by: ORTHOPAEDIC SURGERY

## 2019-05-20 PROCEDURE — 40000170 ZZH STATISTIC PRE-PROCEDURE ASSESSMENT II: Performed by: ORTHOPAEDIC SURGERY

## 2019-05-20 PROCEDURE — 25000125 ZZHC RX 250: Performed by: NURSE ANESTHETIST, CERTIFIED REGISTERED

## 2019-05-20 PROCEDURE — 25000128 H RX IP 250 OP 636: Performed by: ORTHOPAEDIC SURGERY

## 2019-05-20 PROCEDURE — 71000013 ZZH RECOVERY PHASE 1 LEVEL 1 EA ADDTL HR: Performed by: ORTHOPAEDIC SURGERY

## 2019-05-20 PROCEDURE — 25000128 H RX IP 250 OP 636: Performed by: NURSE ANESTHETIST, CERTIFIED REGISTERED

## 2019-05-20 PROCEDURE — 37000008 ZZH ANESTHESIA TECHNICAL FEE, 1ST 30 MIN: Performed by: ORTHOPAEDIC SURGERY

## 2019-05-20 PROCEDURE — C1713 ANCHOR/SCREW BN/BN,TIS/BN: HCPCS | Performed by: ORTHOPAEDIC SURGERY

## 2019-05-20 PROCEDURE — 25000128 H RX IP 250 OP 636: Performed by: ANESTHESIOLOGY

## 2019-05-20 PROCEDURE — 71000012 ZZH RECOVERY PHASE 1 LEVEL 1 FIRST HR: Performed by: ORTHOPAEDIC SURGERY

## 2019-05-20 PROCEDURE — 25800030 ZZH RX IP 258 OP 636: Performed by: NURSE ANESTHETIST, CERTIFIED REGISTERED

## 2019-05-20 PROCEDURE — 25000128 H RX IP 250 OP 636: Performed by: PHYSICIAN ASSISTANT

## 2019-05-20 PROCEDURE — 71000027 ZZH RECOVERY PHASE 2 EACH 15 MINS: Performed by: ORTHOPAEDIC SURGERY

## 2019-05-20 PROCEDURE — 25000132 ZZH RX MED GY IP 250 OP 250 PS 637: Performed by: PHYSICIAN ASSISTANT

## 2019-05-20 PROCEDURE — 36000060 ZZH SURGERY LEVEL 3 W FLUORO 1ST 30 MIN: Performed by: ORTHOPAEDIC SURGERY

## 2019-05-20 DEVICE — IMP SCR SYN CORTEX 1.5X16MM SELF TAP SS 200.816: Type: IMPLANTABLE DEVICE | Site: FOOT | Status: FUNCTIONAL

## 2019-05-20 DEVICE — IMP SCR SYN CORTEX 2.0X16MM SELF TAP SS 201.816: Type: IMPLANTABLE DEVICE | Site: FOOT | Status: FUNCTIONAL

## 2019-05-20 DEVICE — IMP SCR SYN CORTEX 1.5X14MM SELF TAP SS 200.814: Type: IMPLANTABLE DEVICE | Site: FOOT | Status: FUNCTIONAL

## 2019-05-20 RX ORDER — OXYCODONE AND ACETAMINOPHEN 5; 325 MG/1; MG/1
1 TABLET ORAL
Status: COMPLETED | OUTPATIENT
Start: 2019-05-20 | End: 2019-05-20

## 2019-05-20 RX ORDER — PROPOFOL 10 MG/ML
INJECTION, EMULSION INTRAVENOUS CONTINUOUS PRN
Status: DISCONTINUED | OUTPATIENT
Start: 2019-05-20 | End: 2019-05-20

## 2019-05-20 RX ORDER — CEFAZOLIN SODIUM 1 G/3ML
1 INJECTION, POWDER, FOR SOLUTION INTRAMUSCULAR; INTRAVENOUS SEE ADMIN INSTRUCTIONS
Status: DISCONTINUED | OUTPATIENT
Start: 2019-05-20 | End: 2019-05-20 | Stop reason: HOSPADM

## 2019-05-20 RX ORDER — ONDANSETRON 4 MG/1
4 TABLET, ORALLY DISINTEGRATING ORAL
Status: DISCONTINUED | OUTPATIENT
Start: 2019-05-20 | End: 2019-05-20 | Stop reason: HOSPADM

## 2019-05-20 RX ORDER — HYDRALAZINE HYDROCHLORIDE 20 MG/ML
2.5-5 INJECTION INTRAMUSCULAR; INTRAVENOUS EVERY 10 MIN PRN
Status: DISCONTINUED | OUTPATIENT
Start: 2019-05-20 | End: 2019-05-20 | Stop reason: HOSPADM

## 2019-05-20 RX ORDER — ROPIVACAINE HYDROCHLORIDE 5 MG/ML
INJECTION, SOLUTION EPIDURAL; INFILTRATION; PERINEURAL PRN
Status: DISCONTINUED | OUTPATIENT
Start: 2019-05-20 | End: 2019-05-20 | Stop reason: HOSPADM

## 2019-05-20 RX ORDER — FENTANYL CITRATE 50 UG/ML
INJECTION, SOLUTION INTRAMUSCULAR; INTRAVENOUS PRN
Status: DISCONTINUED | OUTPATIENT
Start: 2019-05-20 | End: 2019-05-20

## 2019-05-20 RX ORDER — AMOXICILLIN 250 MG
1-2 CAPSULE ORAL 2 TIMES DAILY
Qty: 30 TABLET | Refills: 0 | Status: SHIPPED | OUTPATIENT
Start: 2019-05-20 | End: 2022-08-11

## 2019-05-20 RX ORDER — DEXAMETHASONE SODIUM PHOSPHATE 4 MG/ML
INJECTION, SOLUTION INTRA-ARTICULAR; INTRALESIONAL; INTRAMUSCULAR; INTRAVENOUS; SOFT TISSUE PRN
Status: DISCONTINUED | OUTPATIENT
Start: 2019-05-20 | End: 2019-05-20

## 2019-05-20 RX ORDER — FENTANYL CITRATE 50 UG/ML
25-50 INJECTION, SOLUTION INTRAMUSCULAR; INTRAVENOUS
Status: DISCONTINUED | OUTPATIENT
Start: 2019-05-20 | End: 2019-05-20 | Stop reason: HOSPADM

## 2019-05-20 RX ORDER — ONDANSETRON 4 MG/1
4 TABLET, ORALLY DISINTEGRATING ORAL EVERY 30 MIN PRN
Status: DISCONTINUED | OUTPATIENT
Start: 2019-05-20 | End: 2019-05-20 | Stop reason: HOSPADM

## 2019-05-20 RX ORDER — CEFAZOLIN SODIUM 2 G/100ML
2 INJECTION, SOLUTION INTRAVENOUS
Status: COMPLETED | OUTPATIENT
Start: 2019-05-20 | End: 2019-05-20

## 2019-05-20 RX ORDER — LIDOCAINE HYDROCHLORIDE 20 MG/ML
INJECTION, SOLUTION INFILTRATION; PERINEURAL PRN
Status: DISCONTINUED | OUTPATIENT
Start: 2019-05-20 | End: 2019-05-20

## 2019-05-20 RX ORDER — DEXAMETHASONE SODIUM PHOSPHATE 4 MG/ML
4 INJECTION, SOLUTION INTRA-ARTICULAR; INTRALESIONAL; INTRAMUSCULAR; INTRAVENOUS; SOFT TISSUE EVERY 10 MIN PRN
Status: DISCONTINUED | OUTPATIENT
Start: 2019-05-20 | End: 2019-05-20 | Stop reason: HOSPADM

## 2019-05-20 RX ORDER — LABETALOL 20 MG/4 ML (5 MG/ML) INTRAVENOUS SYRINGE
10
Status: DISCONTINUED | OUTPATIENT
Start: 2019-05-20 | End: 2019-05-20 | Stop reason: HOSPADM

## 2019-05-20 RX ORDER — SODIUM CHLORIDE, SODIUM LACTATE, POTASSIUM CHLORIDE, CALCIUM CHLORIDE 600; 310; 30; 20 MG/100ML; MG/100ML; MG/100ML; MG/100ML
INJECTION, SOLUTION INTRAVENOUS CONTINUOUS
Status: DISCONTINUED | OUTPATIENT
Start: 2019-05-20 | End: 2019-05-20 | Stop reason: HOSPADM

## 2019-05-20 RX ORDER — NALOXONE HYDROCHLORIDE 0.4 MG/ML
.1-.4 INJECTION, SOLUTION INTRAMUSCULAR; INTRAVENOUS; SUBCUTANEOUS
Status: DISCONTINUED | OUTPATIENT
Start: 2019-05-20 | End: 2019-05-20 | Stop reason: HOSPADM

## 2019-05-20 RX ORDER — PROPOFOL 10 MG/ML
INJECTION, EMULSION INTRAVENOUS PRN
Status: DISCONTINUED | OUTPATIENT
Start: 2019-05-20 | End: 2019-05-20

## 2019-05-20 RX ORDER — ONDANSETRON 2 MG/ML
INJECTION INTRAMUSCULAR; INTRAVENOUS PRN
Status: DISCONTINUED | OUTPATIENT
Start: 2019-05-20 | End: 2019-05-20

## 2019-05-20 RX ORDER — ONDANSETRON 4 MG/1
4-8 TABLET, ORALLY DISINTEGRATING ORAL EVERY 6 HOURS PRN
Qty: 15 TABLET | Refills: 1 | Status: SHIPPED | OUTPATIENT
Start: 2019-05-20 | End: 2022-08-11

## 2019-05-20 RX ORDER — ESTRADIOL 0.03 MG/D
1 FILM, EXTENDED RELEASE TRANSDERMAL
COMMUNITY
End: 2022-08-11

## 2019-05-20 RX ORDER — MEPERIDINE HYDROCHLORIDE 25 MG/ML
12.5 INJECTION INTRAMUSCULAR; INTRAVENOUS; SUBCUTANEOUS
Status: DISCONTINUED | OUTPATIENT
Start: 2019-05-20 | End: 2019-05-20 | Stop reason: HOSPADM

## 2019-05-20 RX ORDER — OXYCODONE AND ACETAMINOPHEN 5; 325 MG/1; MG/1
1-2 TABLET ORAL EVERY 4 HOURS PRN
Qty: 40 TABLET | Refills: 0 | Status: SHIPPED | OUTPATIENT
Start: 2019-05-20 | End: 2022-08-11

## 2019-05-20 RX ORDER — SCOLOPAMINE TRANSDERMAL SYSTEM 1 MG/1
1 PATCH, EXTENDED RELEASE TRANSDERMAL ONCE
Status: COMPLETED | OUTPATIENT
Start: 2019-05-20 | End: 2019-05-20

## 2019-05-20 RX ORDER — SODIUM CHLORIDE, SODIUM LACTATE, POTASSIUM CHLORIDE, CALCIUM CHLORIDE 600; 310; 30; 20 MG/100ML; MG/100ML; MG/100ML; MG/100ML
INJECTION, SOLUTION INTRAVENOUS CONTINUOUS PRN
Status: DISCONTINUED | OUTPATIENT
Start: 2019-05-20 | End: 2019-05-20

## 2019-05-20 RX ORDER — HYDROMORPHONE HYDROCHLORIDE 1 MG/ML
.3-.5 INJECTION, SOLUTION INTRAMUSCULAR; INTRAVENOUS; SUBCUTANEOUS EVERY 10 MIN PRN
Status: DISCONTINUED | OUTPATIENT
Start: 2019-05-20 | End: 2019-05-20 | Stop reason: HOSPADM

## 2019-05-20 RX ORDER — ONDANSETRON 2 MG/ML
4 INJECTION INTRAMUSCULAR; INTRAVENOUS EVERY 30 MIN PRN
Status: DISCONTINUED | OUTPATIENT
Start: 2019-05-20 | End: 2019-05-20 | Stop reason: HOSPADM

## 2019-05-20 RX ADMIN — FENTANYL CITRATE 50 MCG: 50 INJECTION, SOLUTION INTRAMUSCULAR; INTRAVENOUS at 10:43

## 2019-05-20 RX ADMIN — HYDROMORPHONE HYDROCHLORIDE 0.5 MG: 1 INJECTION, SOLUTION INTRAMUSCULAR; INTRAVENOUS; SUBCUTANEOUS at 11:05

## 2019-05-20 RX ADMIN — SODIUM CHLORIDE, POTASSIUM CHLORIDE, SODIUM LACTATE AND CALCIUM CHLORIDE: 600; 310; 30; 20 INJECTION, SOLUTION INTRAVENOUS at 09:25

## 2019-05-20 RX ADMIN — SCOPALAMINE 1 PATCH: 1 PATCH, EXTENDED RELEASE TRANSDERMAL at 08:32

## 2019-05-20 RX ADMIN — PROPOFOL 200 MG: 10 INJECTION, EMULSION INTRAVENOUS at 09:29

## 2019-05-20 RX ADMIN — FENTANYL CITRATE 100 MCG: 50 INJECTION, SOLUTION INTRAMUSCULAR; INTRAVENOUS at 09:29

## 2019-05-20 RX ADMIN — HYDROMORPHONE HYDROCHLORIDE 0.5 MG: 1 INJECTION, SOLUTION INTRAMUSCULAR; INTRAVENOUS; SUBCUTANEOUS at 10:55

## 2019-05-20 RX ADMIN — LIDOCAINE HYDROCHLORIDE 100 MG: 20 INJECTION, SOLUTION INFILTRATION; PERINEURAL at 09:29

## 2019-05-20 RX ADMIN — OXYCODONE HYDROCHLORIDE AND ACETAMINOPHEN 1 TABLET: 5; 325 TABLET ORAL at 11:30

## 2019-05-20 RX ADMIN — MIDAZOLAM 2 MG: 1 INJECTION INTRAMUSCULAR; INTRAVENOUS at 09:26

## 2019-05-20 RX ADMIN — PROPOFOL 100 MCG/KG/MIN: 10 INJECTION, EMULSION INTRAVENOUS at 09:29

## 2019-05-20 RX ADMIN — DEXAMETHASONE SODIUM PHOSPHATE 4 MG: 4 INJECTION, SOLUTION INTRA-ARTICULAR; INTRALESIONAL; INTRAMUSCULAR; INTRAVENOUS; SOFT TISSUE at 09:35

## 2019-05-20 RX ADMIN — ONDANSETRON 4 MG: 2 INJECTION INTRAMUSCULAR; INTRAVENOUS at 09:43

## 2019-05-20 RX ADMIN — CEFAZOLIN SODIUM 2 G: 2 INJECTION, SOLUTION INTRAVENOUS at 09:35

## 2019-05-20 RX ADMIN — FENTANYL CITRATE 50 MCG: 50 INJECTION, SOLUTION INTRAMUSCULAR; INTRAVENOUS at 10:50

## 2019-05-20 ASSESSMENT — ENCOUNTER SYMPTOMS
ORTHOPNEA: 0
DYSRHYTHMIAS: 0
SEIZURES: 0

## 2019-05-20 ASSESSMENT — LIFESTYLE VARIABLES: TOBACCO_USE: 0

## 2019-05-20 ASSESSMENT — MIFFLIN-ST. JEOR: SCORE: 1236.72

## 2019-05-20 NOTE — OP NOTE
Procedure Date: 05/20/2019      PREOPERATIVE DIAGNOSES:   1.  Bilateral severe metatarsus primus varus and hallux valgus deformities.   2.  Bilateral second through fifth hammertoe deformities.   3.  Left second dorsiflexion and valgus deformity at the MTP joint level.      POSTOPERATIVE DIAGNOSIS:     1.  Bilateral severe metatarsus primus varus and hallux valgus deformities.   2.  Bilateral second through fifth hammertoe deformities.   3.  Left second dorsiflexion and valgus deformity at the MTP joint level.       PROCEDURES:   1.  Bilateral Boat metatarsal osteotomies and bunion repairs as well as Akin osteotomies of the proximal phalanx.   2.  Percutaneous flexor tenotomy, second through fifth toes, both feet.   3.  Dorsal and lateral capsulotomy, left second metatarsophalangeal joint.      ANESTHESIA:  General.      SURGEON:  Roman Gao MD      ASSISTANT:  Pavel Cruz PA-C      ANESTHESIA:  General.      PREAMBLE:  Ms. Ortiz presented with bilateral bunion deformities.  She tried conservative management to no avail.  She has a large metatarsus primus varus and hallux valgus.      She also has hammertoe deformities.  The left second toe is in dorsiflexion and lateral deviation.      Informed consent was obtained for the above-mentioned procedure.  Two grams of Ancef was given prior to surgery.  There was no need for postoperative antibiotic prophylaxis.      DESCRIPTION OF PROCEDURE:  After adequate induction of a general anesthetic, the patient was positioned supine on the operating table.  Both legs were sterilely prepped and free draped in the usual fashion.  Tourniquet around the right leg was first inflated to 300 mmHg.      A 2 cm incision was made in the first webspace to do the adductor hallucis release.      This was followed by a 7 cm longitudinal incision medial over the great toe MTP joint.  The capsule was incised longitudinally.  The medial eminence was removed with a saw.  This was  followed by a 90-degree metatarsal osteotomy with a long plantar limb.  The capital fragment was translated laterally by 7 mm and immobilized with 2 small screws.  The sesamoids were then reduced and the medial capsule repaired with a suture.  This gave satisfactory intermetatarsal alignment.      There was still hallux interphalangeus and a 20-degree medially based closing wedge osteotomy was done of the proximal phalanx to correct the hallux interphalangeus.  This was reduced with a suture.      This was followed by percutaneous flexor tenotomies of the second, third, fourth and fifth toes at the DIP joint levels to correct hammertoe deformities.      Attention was moved to the left foot.  A 2 cm incision was made in the first webspace to do the adductor hallucis release.      This was followed by a 7 cm longitudinal incision medial over the great toe MTP joint.  The capsule was incised longitudinally.  The medial eminence was removed with a saw.  This was followed by a 90-degree metatarsal osteotomy with a long plantar limb.  The capital fragment was translated laterally by 7 mm and immobilized with 2 small screws.  The sesamoids were then reduced and the medial capsule repaired with a suture.  This gave satisfactory intermetatarsal alignment.      There was still hallux interphalangeus and a 20-degree medially based closing wedge osteotomy was done of the proximal phalanx to correct the hallux interphalangeus.  This was reduced with a suture.      This was followed by percutaneous flexor tenotomies of the second, third, fourth and fifth toes at the DIP joint levels to correct hammertoe deformities.      Attention was moved to the left second metatarsophalangeal joint.  A longitudinal incision was made over the dorsum of the MTP joint.  The dorsal and lateral capsulotomy was done to help reduce the toe that was in extension and valgus due to the severe hallux valgus.      The wounds were then closed in layers in  both feet.  A sterile dressing and a light compressive bandage were applied.  She tolerated the procedure well and was taken to the recovery room in satisfactory condition.      She can ambulate weightbearing as tolerated.  Sutures will be removed in 2 weeks.         CESILIA SABA MD             D: 2019   T: 2019   MT: [email or web address]      Name:     LINCOLN MONZON   MRN:      [id number]        Account:        [id number]   :      1955           Procedure Date: 2019      Document: [id number]

## 2019-05-20 NOTE — ANESTHESIA PREPROCEDURE EVALUATION
Anesthesia Pre-Procedure Evaluation    Patient: Joyce Ortiz   MRN: [id number] : 1955          Preoperative Diagnosis: bilateral bunion    Procedure(s):  BILATERAL BUNION REPAIR AND AKIN WITH TWO THROUGH FOURTH TOE FLEXOR RELEASES    Past Medical History:   Diagnosis Date     Arthritis     OA OF NECK     Basal cell carcinoma      Cutaneous T-cell lymphoma (H)      Gastroesophageal reflux disease      Heart murmur     faint     Hypertension      Hypothyroidism (acquired)      Lentigo maligna (H)     OF CHEEK     Motion sickness      PONV (postoperative nausea and vomiting)      Past Surgical History:   Procedure Laterality Date     ARTHROSCOPY SHOULDER Right     Dr. Calhoun     BIOPSY OF SKIN LESION       BONE MARROW ASPIRATION AND EXAM       COLONOSCOPY       CYSTOSCOPY N/A 2017    Procedure: CYSTOSCOPY;;  Surgeon: Fern Louie MD;  Location:  OR     EYE SURGERY      cataract surgery bilateral     GYN SURGERY      bartholin cyst     HC KNEE SCOPE,MED/LAT MENISCUS REPAIR Left 7/1/10    Dr. Souza     HC REDUCTION OF LARGE BREAST      Dr. Downing/Dr. Campa     HYSTEROSCOPY      [facility]     LABIALPLASTY  2017    Procedure: LABIAPLASTY;;  Surgeon: Fern Louie MD;  Location:  OR     LAPAROSCOPIC HYSTERECTOMY TOTAL N/A 2017    Procedure: LAPAROSCOPIC HYSTERECTOMY TOTAL;  TOTAL LAPAROSCOPIC HYSTERECTOMY, Cystoscopy, LABIAPLASTY Bilateral;  Surgeon: Fern Louie MD;  Location:  OR     OOPHORECTOMY  3/26/10    Dr. Louie     ORTHOPEDIC SURGERY Right 2016    right knee arthroscopy       Anesthesia Evaluation     . Pt has had prior anesthetic. Type: General    History of anesthetic complications   - PONV        ROS/MED HX    ENT/Pulmonary:      (-) tobacco use, asthma, sleep apnea and recent URI   Neurologic:      (-) seizures and CVA   Cardiovascular:     (+) hypertension----. : . . . :. .      (-) angina, CAD, orthopnea/PND, syncope, arrhythmias, irregular  "heartbeat/palpitations and angina   METS/Exercise Tolerance:  >4 METS   Hematologic:        (-) History of Transfusion   Musculoskeletal:   (+) arthritis,  -       GI/Hepatic:     (+) GERD (occasional - certain foods/oral intake) Asymptomatic on medication,      (-) liver disease   Renal/Genitourinary:  - ROS Renal section negative    (-) renal disease   Endo:     (+) thyroid problem hypothyroidism, .   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  No evidence of lymphadenopathy.        Other:                          Physical Exam  Normal systems: cardiovascular and pulmonary    Airway   Mallampati: II  TM distance: >3 FB  Neck ROM: full    Dental   (+) chipped    Cardiovascular       Pulmonary             Lab Results   Component Value Date    WBC 8.1 06/17/2014    HGB 11.3 (L) 04/25/2017    HCT 39.9 06/17/2014     06/17/2014    HCG Negative 03/26/2010       Preop Vitals  BP Readings from Last 3 Encounters:   11/06/17 125/78   04/25/17 128/61   11/07/16 110/73    Pulse Readings from Last 3 Encounters:   11/06/17 70   11/07/16 63   11/23/15 65      Resp Readings from Last 3 Encounters:   04/25/17 16   06/24/14 20   06/17/14 16    SpO2 Readings from Last 3 Encounters:   04/25/17 98%   06/24/14 96%   06/17/14 96%      Temp Readings from Last 1 Encounters:   04/25/17 37.4  C (99.3  F) (Oral)    Ht Readings from Last 1 Encounters:   04/24/17 1.549 m (5' 1\")      Wt Readings from Last 1 Encounters:   04/24/17 67.4 kg (148 lb 8 oz)    Estimated body mass index is 28.06 kg/m  as calculated from the following:    Height as of 4/24/17: 1.549 m (5' 1\").    Weight " as of 4/24/17: 67.4 kg (148 lb 8 oz).       Anesthesia Plan      History & Physical Review  History and physical reviewed and following examination; no interval change.    ASA Status:  3 .    NPO Status:  > 8 hours    Plan for General and LMA with Intravenous and Propofol induction. Maintenance will be TIVA.    PONV prophylaxis:  Ondansetron (or other 5HT-3), Dexamethasone or Solumedrol and Scopolamine patch       Postoperative Care  Postoperative pain management:  Multi-modal analgesia.      Consents  Anesthetic plan, risks, benefits and alternatives discussed with:  Patient..                 Zari Shah MD

## 2019-05-20 NOTE — DISCHARGE INSTRUCTIONS
Information for Patients Discharging with a Transderm Scopolamine Patch       Dry mouth is a common side effect.    Drowsiness is another common side effect especially when combined with pain medication.  Please avoid activities that require mental alertness such as driving a car or making important legal decisions.    Since Scopolamine can cause temporary dilation of the pupils and blurred vision if it comes in contact with the eyes; be sure to wash your hands thoroughly with soap and water immediately after handling the patch.   When you remove your patch, please stick it to a tissue or paper towel for disposal.      Remove the patch immediately and contact a physician in the unlikely event that you experience symptoms of acute glaucoma (pain and reddening of the eyes, accompanied by dilated pupils).    Remove the patch if you develop any difficulties urinating.  If you cannot urinate after removing your patch, please notify your surgeon.    Remove the patch 24 hours after surgery.    Same Day Surgery Discharge Instructions for  Sedation and General Anesthesia       It's not unusual to feel dizzy, light-headed or faint for up to 24 hours after surgery or while taking pain medication.  If you have these symptoms: sit for a few minutes before standing and have someone assist you when you get up to walk or use the bathroom.      You should rest and relax for the next 24 hours. We recommend you make arrangements to have an adult stay with you for at least 24 hours after your discharge.  Avoid hazardous and strenuous activity.      DO NOT DRIVE any vehicle or operate mechanical equipment for 24 hours following the end of your surgery.  Even though you may feel normal, your reactions may be affected by the medication you have received.      Do not drink alcoholic beverages for 24 hours following surgery.       Slowly progress to your regular diet as you feel able. It's not unusual to feel nauseated and/or vomit after  receiving anesthesia.  If you develop these symptoms, drink clear liquids (apple juice, ginger ale, broth, 7-up, etc. ) until you feel better.  If your nausea and vomiting persists for 24 hours, please notify your surgeon.        All narcotic pain medications, along with inactivity and anesthesia, can cause constipation. Drinking plenty of liquids and increasing fiber intake will help.      For any questions of a medical nature, call your surgeon.      Do not make important decisions for 24 hours.      If you had general anesthesia, you may have a sore throat for a couple of days related to the breathing tube used during surgery.  You may use Cepacol lozenges to help with this discomfort.  If it worsens or if you develop a fever, contact your surgeon.       If you feel your pain is not well managed with the pain medications prescribed by your surgeon, please contact your surgeon's office to let them know so they can address your concerns.       POST-OPERATIVE INSTRUCTIONS  FOOT AND ANKLE SURGERY  QING Gao M.D.  Ryne Cruz P.A.-C    These precautions MUST be followed for the first 24 hours after surgery:    Upon discharge, go directly home.    You must make arrangements to have a responsible adult stay with you.    DO NOT DRINK ALCOHOLIC BEVERAGES    It is not unusual to feel lightheaded up to 24 hours after surgery or while taking pain medication.  If you feel lightheaded, sit for a few minutes before standing and have someone assisted you when you get up to walk or use the restroom.    Do not use any mechanical equipment.    Do not make any important or legal decisions for 24 hours or while on pain medications.    You may experience dry mouth, sore throat, or sleep disturbances from the anesthesia and medications used during surgery.  Generalized muscle aches can sometimes occur.  These usually disappear in 12-24 hours.    POST-OPERATIVE CARE GUIDELINES    The following are general guidelines about  what to expect the first days/weeks after surgery.  They are not specific, and your recovery may be slightly different.    Blood clots are not common, but are emergencies.  If you have sudden onset pain in your calf or leg, or have sudden shortness of breath, go to the Emergency Department.      Elevation of your operative foot/ankle is key to reducing the swelling in the immediate post-operative phase (first 3-5 days).  When you are at rest, elevate your foot at or above the level of your heart.  When sitting, your foot should be elevated on a chair or stool; not hanging down.      You should plan to rest the day after surgery no matter how minor the procedure.  More complicated procedures will require more time to return to normal activity.      Foot and ankle surgery is painful in most cases.   It is not unusual for the pain to be worse a day or two after surgery than on the day of.  If your pain is more than 8/10 contact our office.  If you don t have pain, gradually decrease your pain meds by substituting Tylenol.  Please don t use Advil/ibuprofen unless we order it for you.      You will be prescribed Percocet or Vicodin for pain, Vistaril for spasms/pain adjunct, Senokot for constipation.  If you have had trouble taking these meds before or experience nausea or vomiting after surgery from your medication, please advise the recovery room nurses.  If you are already at home, try drinking only clear liquids and/or call our office.  Start taking narcotic pain medication when you feel sensation in your lower extremity after a block.       All pain medications, along with inactivity can cause constipation.  Use the Senakot as directed, increase fluid intake to 1 quart per day and increase your dietary fiber.  (The  P  fruits - peaches, plums, pears, and prunes as well as anise/black licorice are recommended.)    It is not unusual to run a low-grade fever after surgery.  If your temperature is elevated above 102 ,  lasts longer than 24 hours, or is questionable in any way, contact our office.      Drainage from your cast/dressing is normal.  Reinforce your cast/dressing with 4x4 dressings and cover with an Ace wrap.  Wait until 24 hours after surgery to change your dressings; by this time most of your bleeding will have stopped.  If you have drainage through your dressings 2 days after surgery, contact our office.      You cast/dressing will be changed at your 2-week follow up appointment.  They should be kept clean and DRY.  If your cast/dressing is damaged before that, contact our office.      It is normal to experience some bruising in the toes after surgery and they may appear  dark  when your foot hangs down.  It is important to actively wiggle your toes for at least 5-10 minutes each hour UNLESS you had surgery on those toes.      Use ice on your foot/ankle over the dressing/cast for 20 minutes per hour, 10 or more times per day.  A large bag of frozen peas works well.      Bathing: take a tub or sponge bath instead of a shower if possible during the first two weeks.  If you choose to shower, cover the dressing/cast with a waterproof covering, these may be ordered from www.seal-tight.com or are available at some pharmacies/medical supply stores.  Another option is XeroSox, which is the original vacuum sealed bandage and cast cover.  The cast cover has a vacuum seal and is absolutely waterproof.  [phone] or www.xerosox.com for more information.      Driving:  For surgery on the left foot/ankle, you may drive as soon as narcotic medications are stopped.  For surgery on the right foot/ankle, you may drive when you are out of a cast, off pain medications, and you feel secure with braking.      In general, listen to your foot/ankle.  If you have a sudden, dramatic increase in pain that does not resolve after an hour or so, something is wrong - call our office.  If you fall or bump your foot/ankle and have sudden pain  that resolves, give it 24 hours before you call.      Many of your questions can be addressed at your 2-week follow-up appointment - please make a list of things to ask us as they come up during your recovery.      If you had a nerve block it is common to have numbness in your leg and foot for up to 30 hours after surgery.  If your leg or foot is still numb more than 30 hours after surgery, please call the office.      FUTURE DENTIST VISITS:  If you have had a total ankle arthroplasty please be advised you must be on antibiotics prior to ANY dental work.  Please call your dentist office ahead of time and make them aware of this as your dentist will be able to order the prescription.  If you have had any other surgery this is not a concern.    If you have any further questions or concerns, please call our office at [phone]    **If you have questions or concerns about your procedure,   call Dr. Gao at [phone]**    Crutch Instructions      Because of your surgery you will need crutches.  Make sure you tell your healthcare provider if crutches do not seem to work for you.  Using Crutches   Crutches are the most commonly used device for injuries to the lower part of the body because they allow the most mobility. All walking assistance devices require strength in your upper body but crutches require more coordination. If you are unable to use crutches then a cane or walker may be better.   Remember these rules when using crutches:   Always look straight ahead when you walk. Do not look down.   Hold the top padded part of the crutches into your chest near your armpits. Grasp the padded hand  and always support your weight with your arms and hands.   Do not lean on the crutches with your armpit as this could cause nerve damage.  Standing Up  Make sure you are in a stable chair. Move forward to the edge of the chair so that your  good  foot is flat on the floor. Put both crutches together and hold the  "handgrips in one hand. Stretch the injured leg out straight and then use the crutches with one hand and the chair armrest with the other hand to push yourself into a standing position onto your  good  leg. Once you are standing, wait until you are steady before placing a crutch in each hand. Until you become good at standing, have someone assist you in case you lose your balance. When standing still, lean slightly forward with the crutches ahead of you and about 3 feet apart. Unless you are told otherwise, you should keep weight off your injured leg.  Walking  To begin crutch walking, balance yourself on your  good  leg. Move both crutches forward about the length of one step and place them firmly on the floor in front of you about 3 feet apart. Support your weight on the padded hand rests while leaning forward. Press the top of the crutches against your chest wall and not into your armpits. Be sure to hold the injured leg up off of the floor. When ready, swing the \"good\" leg forward about one step length past the crutches while supporting your weight on the padded hand . Be careful not to go too far. Transfer your weight onto the \"good\" leg then bring both crutches forward about the length of one step. Repeating this motion will allow you to move fairly quickly without the use of your injured leg. Keep practicing until you become good at crutch walking.  Sitting Down  Make sure the chair you are about to sit on is stable. Walk in front of the chair such that you are facing away from it. Move back a little at a time until the back of your  good  leg is nearly touching the seat. Keeping your weight on the  good  leg, move both crutches to one hand holding onto the handgrips. Lean forward, bend your  good  knee, and move your injured leg out forward. Sit down slowly while using your free hand to reach for the chair s armrest for support. Place the crutches where you can easily get them. Never pivot, even on your " " good  leg. This could cause you to fall or injure your  good  leg.  Navigating Stairs, Curbs & Door Steps  Only attempt this once you are very comfortable with regular crutch walking and only when someone is there to steady you should you begin to lose your balance. Hold on to a  railing with one hand and both crutches in the other hand or have someone carry the loose crutch for you. It does not matter which side the handrail is on. If there is no handrail, you can use both crutches. Using only crutches is the same as the railing method but maintaining your balance can be difficult. The crutch-only method is not recommended until you have become very good at crutch walking. Be sure to only take one step at a time. A simple rule to remember for crutches when navigating stairs or curbs: up with the  good  leg and down with the  bad .  Going Up Stairs or Curbs  Walk close to the first step with the crutches slightly behind you. Push down on the handrail and the crutch and step up with the \"good\" leg. You may have to make a short hop to do this if you are not allowed to place any weight on the injured leg. Lean forward and bring the injured leg and the crutch up beside the \"good\" leg. Repeat this until you have climbed up all of the steps. Remember, the \"good\" leg goes up first and the crutches move with the injured leg. The person helping you should stand behind and to your side that is away from the railing.  Going Down Stairs or Curbs  Walk to the edge of the first step. While standing and balancing on the  good  leg, place both crutches in one hand and then down on the step below. Be sure to support your weight by leaning on the crutches and handrail. Bring the injured leg forward, then the \"good\" leg down to the same step as the crutches. Remember crutches go down first with the injured leg. The person helping you should  front and to your side that is away from the railing.  Sitting Method for " "Navigating Stairs  A safer way to get up and down stairs is to sit down. To go up steps, sit down on the second or third step. Push with your  good  leg below while pushing up with both arms on the step above to move your bottom to the next step. When you get to the top of the stairs you may need to use the  scooting  method described later to get back up. To go down steps you first need to lower yourself to the floor near the top of the steps. Once seated, support yourself with your  good  leg on the second to next step below, and push with both arms on the step where you are sitting to move your bottom down to the next step. When you reach the bottom, pull yourself up to standing position using your crutches. It is helpful if someone can move your crutches and assist you in getting up and down. With practice it may be possible to manage on your own.  If You Start To Fall  If you start to fall and cannot get your balance, throw the crutches away from you. Try to fall onto your  good  side away from your injury and then break your fall with your arms. If uninjured, you can usually get back up by moving into a sitting position and scooting to a chair. Push with your arms and hands on the chair seat while pushing with the \"good\" leg to get up into the chair. You should not attempt to get back up if you are having significant pain. Call for assistance or dial 911 for an ambulance if necessary.  Safety Tips for Crutch Walking   At first you may want to wear a training belt (or a strong regular belt) so others can assist you.   Do not use crutches if you feel dizzy or drowsy.   Be careful on slick or wet surfaces like a kitchen or bathroom floor, snow, ice, or rainy conditions.   Temporarily remove pets, throw rugs or other items from your home that might trip you.   Do not hop around while holding onto furniture.   Wear sneakers or rubber soled shoes that will not easily slip or come off.   Be careful on ramps or slopes " as they can be harder to walk up or down   Do not remove any parts from your crutches especially the padding or rubber traction footings. Replace padding or footings that become damaged immediately.   It is important to use your crutches correctly. If you feel any numbness or tingling in your arms, you are probably using the crutches incorrectly.  Helpful Hints   A bedside toilet or toilet riser may be helpful.   Always allow for extra time to get around. Children in school should be given permission to leave class early to avoid crowds when changing classes.   Elevate the injured leg when sitting.   Use a backpack to carry books or waist pouch to carry other items so that both hands are free.   Call your healthcare provider if you have any questions or concerns.      Discharge Instructions: Using an   Incentive Spirometer    An incentive spirometer is a device that helps you do deep breathing exercises. These exercises will help you breathe better and improve the function of your lungs. The incentive spirometer provides a way for you to take an active part in your recovery.  Follow these steps to use your incentive spirometer:  Inhale normally. Relax and breathe out.  Place your lips tightly around the mouthpiece.  Make sure the device is upright and not tilted.  Breathe in slowly and deeply. Fill your lungs with as much air as you can.   Hold your breath long enough to keep the disk raised for at least 3 seconds while keeping the bead on the right side between the two arrows.   Perform this exercise 10 times every hour while you re awake or as often as your doctor instructs.  If you were also taught coughing exercises, perform them regularly as instructed.

## 2019-05-20 NOTE — ANESTHESIA POSTPROCEDURE EVALUATION
Patient: Joyce Ortiz    Procedure(s):  BILATERAL BUNION REPAIR AND AKIN WITH TWO THROUGH FOURTH TOE FLEXOR RELEASES    Diagnosis:bilateral bunion  Diagnosis Additional Information: No value filed.    Anesthesia Type:  General, LMA    Note:  Anesthesia Post Evaluation    Patient location during evaluation: PACU  Patient participation: Able to fully participate in evaluation  Level of consciousness: awake and alert  Pain management: adequate  Airway patency: patent  Cardiovascular status: acceptable  Respiratory status: acceptable and unassisted  Hydration status: acceptable  PONV: none             Last vitals:  Vitals:    05/20/19 1034 05/20/19 1045 05/20/19 1100   BP: 116/68 124/69 114/66   Pulse:  64 58   Resp: 14 14 10   Temp: 36.3  C (97.3  F)  36.1  C (97  F)   SpO2: 99% 98% 95%         Electronically Signed By: Zari Shah MD  May 20, 2019  11:13 AM

## 2019-05-20 NOTE — ANESTHESIA CARE TRANSFER NOTE
Patient: Joyce Ortiz    Procedure(s):  BILATERAL BUNION REPAIR AND AKIN WITH TWO THROUGH FOURTH TOE FLEXOR RELEASES    Diagnosis: bilateral bunion  Diagnosis Additional Information: No value filed.    Anesthesia Type:   General, LMA     Note:  Airway :Face Mask  Patient transferred to:PACU  Comments: Pt exhibits spontaneous respirations, follows commands, suctioned, LMA removed, exchanging well, transferred to pacu with O2 @ 10L via mask, all monitors and alarms on, report to RN, VSS.Handoff Report: Identifed the Patient, Identified the Reponsible Provider, Reviewed the pertinent medical history, Discussed the surgical course, Reviewed Intra-OP anesthesia mangement and issues during anesthesia, Set expectations for post-procedure period and Allowed opportunity for questions and acknowledgement of understanding      Vitals: (Last set prior to Anesthesia Care Transfer)    CRNA VITALS  5/20/2019 0955 - 5/20/2019 1030      5/20/2019             Pulse:  69    SpO2:  98 %    Resp Rate (observed):  1  (Abnormal)                 Electronically Signed By: YUSUF Lloyd CRNA  May 20, 2019  10:30 AM

## 2019-11-06 ENCOUNTER — HEALTH MAINTENANCE LETTER (OUTPATIENT)
Age: 64
End: 2019-11-06

## 2019-12-04 ENCOUNTER — HOSPITAL ENCOUNTER (OUTPATIENT)
Dept: MAMMOGRAPHY | Facility: CLINIC | Age: 64
Discharge: HOME | End: 2019-12-04
Attending: OBSTETRICS & GYNECOLOGY | Admitting: OBSTETRICS & GYNECOLOGY
Payer: MEDICARE

## 2019-12-04 DIAGNOSIS — Z12.31 OTHER SCREENING MAMMOGRAM: ICD-10-CM

## 2019-12-04 PROCEDURE — 77067 SCR MAMMO BI INCL CAD: CPT

## 2020-02-16 ENCOUNTER — HEALTH MAINTENANCE LETTER (OUTPATIENT)
Age: 65
End: 2020-02-16

## 2020-12-04 ENCOUNTER — HOSPITAL ENCOUNTER (OUTPATIENT)
Dept: MAMMOGRAPHY | Facility: CLINIC | Age: 65
Discharge: HOME | End: 2020-12-04
Attending: OBSTETRICS & GYNECOLOGY | Admitting: OBSTETRICS & GYNECOLOGY
Payer: MEDICARE

## 2020-12-04 DIAGNOSIS — Z12.31 ENCOUNTER FOR SCREENING MAMMOGRAM FOR BREAST CANCER: ICD-10-CM

## 2020-12-04 PROCEDURE — 77063 BREAST TOMOSYNTHESIS BI: CPT

## 2021-04-10 ENCOUNTER — HEALTH MAINTENANCE LETTER (OUTPATIENT)
Age: 66
End: 2021-04-10

## 2021-09-19 ENCOUNTER — HEALTH MAINTENANCE LETTER (OUTPATIENT)
Age: 66
End: 2021-09-19

## 2021-11-09 ENCOUNTER — LAB REQUISITION (OUTPATIENT)
Dept: LAB | Facility: CLINIC | Age: 66
End: 2021-11-09

## 2021-11-09 LAB
T3FREE SERPL-MCNC: 3.4 PG/ML (ref 2.3–4.2)
T4 FREE SERPL-MCNC: 1.16 NG/DL (ref 0.76–1.46)

## 2021-11-09 PROCEDURE — 84481 FREE ASSAY (FT-3): CPT | Performed by: FAMILY MEDICINE

## 2021-11-09 PROCEDURE — 84439 ASSAY OF FREE THYROXINE: CPT | Performed by: FAMILY MEDICINE

## 2022-05-01 ENCOUNTER — HEALTH MAINTENANCE LETTER (OUTPATIENT)
Age: 67
End: 2022-05-01

## 2022-08-11 ENCOUNTER — VIRTUAL VISIT (OUTPATIENT)
Dept: NEUROLOGY | Facility: CLINIC | Age: 67
End: 2022-08-11
Payer: MEDICARE

## 2022-08-11 DIAGNOSIS — R42 VERTIGO: Primary | ICD-10-CM

## 2022-08-11 PROCEDURE — 99442 PR PHYSICIAN TELEPHONE EVALUATION 11-20 MIN: CPT | Mod: 95 | Performed by: PSYCHIATRY & NEUROLOGY

## 2022-08-11 RX ORDER — AMLODIPINE BESYLATE 5 MG/1
1 TABLET ORAL DAILY
COMMUNITY
Start: 2021-01-22

## 2022-08-11 RX ORDER — PANTOPRAZOLE SODIUM 40 MG/1
TABLET, DELAYED RELEASE ORAL
COMMUNITY
Start: 2022-06-28

## 2022-08-11 RX ORDER — CELECOXIB 100 MG/1
CAPSULE ORAL
COMMUNITY
Start: 2021-11-05

## 2022-08-11 NOTE — NURSING NOTE
"Joyce Ortiz is a 66 year old female who presents for:  No chief complaint on file.       Initial Vitals:  There were no vitals taken for this visit. Estimated body mass index is 31.21 kg/m  as calculated from the following:    Height as of 5/20/19: 1.549 m (5' 1\").    Weight as of 5/20/19: 74.9 kg (165 lb 3.2 oz).. There is no height or weight on file to calculate BSA. BP completed using cuff size: NA (Not Take    Yenifer Brady    "

## 2022-08-11 NOTE — LETTER
8/11/2022         RE: Joyce Ortiz  [address] [place] [address]        Dear Colleague,    Thank you for referring your patient, Joyce Ortiz, to the [facility] NEUROLOGY CLINICS [facility]. Please see a copy of my visit note below.        [facility]    Joyce Ortiz MRN# [id number]   Age: 66 year old YOB: 1955     Requesting physician: No ref. provider found  Christina Vargas            Assessment and Plan:   Assessment:  1.  Movement associated vertigo  2.  History of allergies     Plan:  1.  Referral to the [facility] for evaluation and treatment    Given her ongoing difficulties with vertigo, it sounds more of a vestibular labyrinthine origin than neurologic and will refer her to the [place] [facility] for their evaluation and interventions.  She will call for follow-up as needed.             History of Present Illness:   CC:  Telephone consultation was held with Ms. Ortiz pertaining to recent onset vertigo.  She developed acute vertigo in early April of this year following a mildly symptomatic COVID illness.  She was treated at that time with some antihistamines with limited benefit but ultimately her symptoms subsided.  She has had however ongoing movement associated vertigo on almost a continuous basis since then.  This has not been associated with any tinnitus or hearing change although she does have sinus congestion and some past history of allergies.  She did see an ENT physician in July of this year and was referred to physical therapy however Epley maneuver intervention provided her with little benefit.  Given her concerns pertaining to neurological involvement a telephone discussion was held with her today.    She reports no other neurological complaints or concerns regarding headaches, diplopia, dysarthria, dysphagia, weakness numbness or tingling, loss of consciousness or seizures.    Her general medical health has  been excellent.  She is bothered by polyarthralgias particularly of her knees and toes.               Physical Exam:   Not performed.         Data:   All laboratory data reviewed  All imaging studies reviewed by me             DATA for DOCUMENTATION:         Past Medical History:     Patient Active Problem List   Diagnosis     Cutaneous T-cell lymphoma (H)     Cutaneous CD30+ lymphoproliferative disorder (H)     History of melanoma     Lichen sclerosus et atrophicus     S/P laparoscopic hysterectomy     Past Medical History:   Diagnosis Date     Arthritis     OA OF NECK     Basal cell carcinoma      Cutaneous T-cell lymphoma (H)      Gastroesophageal reflux disease      Heart murmur     faint     Hypertension      Hypothyroidism (acquired)      Lentigo maligna (H)     OF CHEEK     Melanoma (H)      Motion sickness      PONV (postoperative nausea and vomiting)        Also see scanned health assessment forms.       Past Surgical History:     Past Surgical History:   Procedure Laterality Date     ARTHROSCOPY SHOULDER Right 1990    Dr. Calhoun     BIOPSY OF SKIN LESION       BONE MARROW ASPIRATION AND EXAM       BUNIONECTOMY Bilateral 5/20/2019    Procedure: BILATERAL BUNION REPAIR AND AKIN WITH TWO THROUGH FOURTH TOE FLEXOR RELEASES;  Surgeon: Roman Gao MD;  Location:  OR     COLONOSCOPY       CYSTOSCOPY N/A 4/24/2017    Procedure: CYSTOSCOPY;;  Surgeon: Fern Louie MD;  Location:  OR     EYE SURGERY      cataract surgery bilateral     GYN SURGERY      bartholin cyst     HC KNEE SCOPE,MED/LAT MENISCUS REPAIR Left 7/1/10    Dr. Souza     HC REDUCTION OF LARGE BREAST  1978/1998    Dr. Downing/Dr. Campa     HYSTEROSCOPY      D&C     LABIALPLASTY  4/24/2017    Procedure: LABIAPLASTY;;  Surgeon: Fern Louie MD;  Location:  OR     LAPAROSCOPIC HYSTERECTOMY TOTAL N/A 4/24/2017    Procedure: LAPAROSCOPIC HYSTERECTOMY TOTAL;  TOTAL LAPAROSCOPIC HYSTERECTOMY, Cystoscopy, LABIAPLASTY Bilateral;   Surgeon: Fern Louie MD;  Location: [facility] OR     OOPHORECTOMY  3/26/10    Dr. Louie     ORTHOPEDIC SURGERY Right 2016    right knee arthroscopy            Social History:     Social History     Socioeconomic History     Marital status: Single     Spouse name: Not on file     Number of children: Not on file     Years of education: Not on file     Highest education level: Not on file   Occupational History     Not on file   Tobacco Use     Smoking status: Never Smoker     Smokeless tobacco: Never Used   Substance and Sexual Activity     Alcohol use: Yes     Comment: occasionally     Drug use: No     Sexual activity: Not on file   Other Topics Concern     Parent/sibling w/ CABG, MI or angioplasty before 65F 55M? Not Asked   Social History Narrative     Not on file     Social Determinants of Health     Financial Resource Strain: Not on file   Food Insecurity: Not on file   Transportation Needs: Not on file   Physical Activity: Not on file   Stress: Not on file   Social Connections: Not on file   Intimate Partner Violence: Not on file   Housing Stability: Not on file              Family History:     Family History   Problem Relation Age of Onset     Cancer Sister         melanoma     Skin Cancer Sister      Melanoma No family hx of             Medications:     Current Outpatient Medications   Medication Sig     amLODIPine (NORVASC) 5 MG tablet Take 1 tablet by mouth daily     celecoxib (CELEBREX) 100 MG capsule      Levothyroxine Sodium (SYNTHROID PO) Take 0.075 mcg by mouth daily     Liothyronine Sodium (CYTOMEL PO) Take 5 mcg by mouth daily     NONFORMULARY Take 1 tablet by mouth 2 times daily Hair Repair  Contains:   5000mcg Biotin  25mg Zinc  75mg Selenium  600mg Saw Palmetto     Nutritional Supplements (DHEA PO) Take 25 mg by mouth daily     pantoprazole (PROTONIX) 40 MG EC tablet TAKE 1 TABLET BY MOUTH 30 MINUTES BEFORE DINNER     Probiotic Product (PROBIOTIC DAILY PO) Take 3 capsules by mouth daily       estradiol (VIVELLE-DOT) 0.025 MG/24HR bi-weekly patch Place 1 patch onto the skin twice a week     ondansetron (ZOFRAN-ODT) 4 MG ODT tab Take 1-2 tablets (4-8 mg) by mouth every 6 hours as needed for nausea     oxyCODONE-acetaminophen (PERCOCET) 5-325 MG tablet Take 1-2 tablets by mouth every 4 hours as needed for moderate to severe pain     Progesterone Micronized (PROGESTERONE PO) Take 100 mg by mouth daily      senna-docusate (SENOKOT-S/PERICOLACE) 8.6-50 MG tablet Take 1-2 tablets by mouth 2 times daily     testosterone (ANDROGEL) 25 MG/2.5GM (1%) gel 1 packet     No current facility-administered medications for this visit.              Review of Systems:   A comprehensive 10 point review of systems (constitutional, ENT, cardiac, peripheral vascular, lymphatic, respiratory, GI, , Musculoskeletal, skin, Neurological) was performed and found to be negative except as described in this note.     See intake form completed by patient        Again, thank you for allowing me to participate in the care of your patient.        Sincerely,        Andrea Shahid MD, MD

## 2022-08-11 NOTE — PROGRESS NOTES
[facility]    Joyce Ortiz MRN# [id number]   Age: 66 year old YOB: 1955     Requesting physician: No ref. provider found  Christina Vargas            Assessment and Plan:   Assessment:  1.  Movement associated vertigo  2.  History of allergies     Plan:  1.  Referral to the [facility] for evaluation and treatment    Given her ongoing difficulties with vertigo, it sounds more of a vestibular labyrinthine origin than neurologic and will refer her to the [facility] for their evaluation and interventions.  She will call for follow-up as needed.             History of Present Illness:   CC:  Telephone consultation was held with Ms. Ortiz pertaining to recent onset vertigo.  She developed acute vertigo in early April of this year following a mildly symptomatic COVID illness.  She was treated at that time with some antihistamines with limited benefit but ultimately her symptoms subsided.  She has had however ongoing movement associated vertigo on almost a continuous basis since then.  This has not been associated with any tinnitus or hearing change although she does have sinus congestion and some past history of allergies.  She did see an ENT physician in July of this year and was referred to physical therapy however Epley maneuver intervention provided her with little benefit.  Given her concerns pertaining to neurological involvement a telephone discussion was held with her today.    She reports no other neurological complaints or concerns regarding headaches, diplopia, dysarthria, dysphagia, weakness numbness or tingling, loss of consciousness or seizures.    Her general medical health has been excellent.  She is bothered by polyarthralgias particularly of her knees and toes.               Physical Exam:   Not performed.         Data:   All laboratory data reviewed  All imaging studies reviewed by me             DATA for DOCUMENTATION:         Past  Medical History:     Patient Active Problem List   Diagnosis     Cutaneous T-cell lymphoma (H)     Cutaneous CD30+ lymphoproliferative disorder (H)     History of melanoma     Lichen sclerosus et atrophicus     S/P laparoscopic hysterectomy     Past Medical History:   Diagnosis Date     Arthritis     OA OF NECK     Basal cell carcinoma      Cutaneous T-cell lymphoma (H)      Gastroesophageal reflux disease      Heart murmur     faint     Hypertension      Hypothyroidism (acquired)      Lentigo maligna (H)     OF CHEEK     Melanoma (H)      Motion sickness      PONV (postoperative nausea and vomiting)        Also see scanned health assessment forms.       Past Surgical History:     Past Surgical History:   Procedure Laterality Date     ARTHROSCOPY SHOULDER Right 1990    Dr. Calhoun     BIOPSY OF SKIN LESION       BONE MARROW ASPIRATION AND EXAM       BUNIONECTOMY Bilateral 5/20/2019    Procedure: BILATERAL BUNION REPAIR AND AKIN WITH TWO THROUGH FOURTH TOE FLEXOR RELEASES;  Surgeon: Roman Gao MD;  Location:  OR     COLONOSCOPY       CYSTOSCOPY N/A 4/24/2017    Procedure: CYSTOSCOPY;;  Surgeon: Fern Louie MD;  Location:  OR     EYE SURGERY      cataract surgery bilateral     GYN SURGERY      bartholin cyst     HC KNEE SCOPE,MED/LAT MENISCUS REPAIR Left 7/1/10    Dr. Souza     HC REDUCTION OF LARGE BREAST  1978/1998    Dr. Downing/Dr. Campa     HYSTEROSCOPY      D&C     LABIALPLASTY  4/24/2017    Procedure: LABIAPLASTY;;  Surgeon: Fern Louie MD;  Location:  OR     LAPAROSCOPIC HYSTERECTOMY TOTAL N/A 4/24/2017    Procedure: LAPAROSCOPIC HYSTERECTOMY TOTAL;  TOTAL LAPAROSCOPIC HYSTERECTOMY, Cystoscopy, LABIAPLASTY Bilateral;  Surgeon: Fern Louie MD;  Location:  OR     OOPHORECTOMY  3/26/10    Dr. Louie     ORTHOPEDIC SURGERY Right 2016    right knee arthroscopy            Social History:     Social History     Socioeconomic History     Marital status: Single     Spouse name: Not  on file     Number of children: Not on file     Years of education: Not on file     Highest education level: Not on file   Occupational History     Not on file   Tobacco Use     Smoking status: Never Smoker     Smokeless tobacco: Never Used   Substance and Sexual Activity     Alcohol use: Yes     Comment: occasionally     Drug use: No     Sexual activity: Not on file   Other Topics Concern     Parent/sibling w/ CABG, MI or angioplasty before 65F 55M? Not Asked   Social History Narrative     Not on file     Social Determinants of Health     Financial Resource Strain: Not on file   Food Insecurity: Not on file   Transportation Needs: Not on file   Physical Activity: Not on file   Stress: Not on file   Social Connections: Not on file   Intimate Partner Violence: Not on file   Housing Stability: Not on file              Family History:     Family History   Problem Relation Age of Onset     Cancer Sister         melanoma     Skin Cancer Sister      Melanoma No family hx of             Medications:     Current Outpatient Medications   Medication Sig     amLODIPine (NORVASC) 5 MG tablet Take 1 tablet by mouth daily     celecoxib (CELEBREX) 100 MG capsule      Levothyroxine Sodium (SYNTHROID PO) Take 0.075 mcg by mouth daily     Liothyronine Sodium (CYTOMEL PO) Take 5 mcg by mouth daily     NONFORMULARY Take 1 tablet by mouth 2 times daily Hair Repair  Contains:   5000mcg Biotin  25mg Zinc  75mg Selenium  600mg Saw Palmetto     Nutritional Supplements (DHEA PO) Take 25 mg by mouth daily     pantoprazole (PROTONIX) 40 MG EC tablet TAKE 1 TABLET BY MOUTH 30 MINUTES BEFORE DINNER     Probiotic Product (PROBIOTIC DAILY PO) Take 3 capsules by mouth daily      estradiol (VIVELLE-DOT) 0.025 MG/24HR bi-weekly patch Place 1 patch onto the skin twice a week     ondansetron (ZOFRAN-ODT) 4 MG ODT tab Take 1-2 tablets (4-8 mg) by mouth every 6 hours as needed for nausea     oxyCODONE-acetaminophen (PERCOCET) 5-325 MG tablet Take 1-2  tablets by mouth every 4 hours as needed for moderate to severe pain     Progesterone Micronized (PROGESTERONE PO) Take 100 mg by mouth daily      senna-docusate (SENOKOT-S/PERICOLACE) 8.6-50 MG tablet Take 1-2 tablets by mouth 2 times daily     testosterone (ANDROGEL) 25 MG/2.5GM (1%) gel 1 packet     No current facility-administered medications for this visit.              Review of Systems:   A comprehensive 10 point review of systems (constitutional, ENT, cardiac, peripheral vascular, lymphatic, respiratory, GI, , Musculoskeletal, skin, Neurological) was performed and found to be negative except as described in this note.     See intake form completed by patient

## 2022-08-31 ENCOUNTER — TELEPHONE (OUTPATIENT)
Dept: NEUROLOGY | Facility: CLINIC | Age: 67
End: 2022-08-31

## 2022-08-31 NOTE — TELEPHONE ENCOUNTER
Called [facility] to check on if they received the referral.  The  stated that there is no way to check if a referral was received. She said it will take up to 5 days to review referrals.      Updated patient that referral was faxed, but that they are 5 days out of reviewing referrals.      She verbalized understanding and will let us know if she has troubles getting in.     Demi LUCIANO RN, BSN  [facility] Neurology [facility]

## 2022-08-31 NOTE — TELEPHONE ENCOUNTER
Provider called stating that the referral to the [facility] was not received by the center per patient.      Writer faxed referral, OV notes and face sheet to [facility] at [phone]. Verified that it went through on rightfax.      Will follow up to make sure that is all they need.     Demi LUCIANO RN, BSN  [facility]

## 2022-11-21 ENCOUNTER — HEALTH MAINTENANCE LETTER (OUTPATIENT)
Age: 67
End: 2022-11-21

## 2023-04-16 ENCOUNTER — HEALTH MAINTENANCE LETTER (OUTPATIENT)
Age: 68
End: 2023-04-16

## 2024-02-04 ENCOUNTER — HEALTH MAINTENANCE LETTER (OUTPATIENT)
Age: 69
End: 2024-02-04

## 2025-03-02 ENCOUNTER — HEALTH MAINTENANCE LETTER (OUTPATIENT)
Age: 70
End: 2025-03-02

## 2025-04-19 ENCOUNTER — HEALTH MAINTENANCE LETTER (OUTPATIENT)
Age: 70
End: 2025-04-19

## (undated) DEVICE — NDL INSUFFLATION 14GA STEP S100000

## (undated) DEVICE — ENDO TROCAR 05MM VERSASTEP VS101005

## (undated) DEVICE — EVAC SYSTEM CLEAR FLOW SC082500

## (undated) DEVICE — DRAPE SLEEVE 599

## (undated) DEVICE — Device

## (undated) DEVICE — GLOVE PROTEXIS BLUE W/NEU-THERA 6.5  2D73EB65

## (undated) DEVICE — DRSG ABDOMINAL 07 1/2X8" 7197D

## (undated) DEVICE — SU ETHILON 3-0 FS-1 18" 669H

## (undated) DEVICE — BLADE KNIFE SURG 10 371110

## (undated) DEVICE — SOL WATER IRRIG 1000ML BOTTLE 2F7114

## (undated) DEVICE — ESU PENCIL W/HOLSTER E2350H

## (undated) DEVICE — GLOVE PROTEXIS W/NEU-THERA 6.5  2D73TE65

## (undated) DEVICE — SOL NACL 0.9% IRRIG 1000ML BOTTLE 2F7124

## (undated) DEVICE — BLADE SAW OSCILLATING STRYK MED 9.0X25X0.38MM 2296-003-111

## (undated) DEVICE — ESU HOLDER LAP INST DISP PURPLE LONG 330MM H-PRO-330

## (undated) DEVICE — GLOVE PROTEXIS W/NEU-THERA 8.5  2D73TE85

## (undated) DEVICE — TUBING SUCTION 12"X1/4" N612

## (undated) DEVICE — SU VICRYL 3-0 PS-2 27" UND J427H

## (undated) DEVICE — SU VICRYL 4-0 PS-2 18" UND J496H

## (undated) DEVICE — SPONGE LAP 18X18" X8435

## (undated) DEVICE — GLOVE PROTEXIS POWDER FREE 7.0 ORTHOPEDIC 2D73ET70

## (undated) DEVICE — RETR ELEV / UTERINE MANIPULATOR V-CARE LG CUP 60-6085-202

## (undated) DEVICE — BLADE KNIFE SURG 15 371115

## (undated) DEVICE — SU VICRYL 3-0 RB-1 27" J305H

## (undated) DEVICE — SUCTION IRR TRUMPET VALVE LAP ASU1201

## (undated) DEVICE — SOL NACL 0.9% IRRIG 1000ML BOTTLE 07138-09

## (undated) DEVICE — SU VICRYL 1 CT-1 27" UND J261H

## (undated) DEVICE — TUBING IRR TUR Y TYPE 2C4041

## (undated) DEVICE — SU VICRYL 0 CT-1 27" J340H

## (undated) DEVICE — KIT PATIENT POSITIONING PIGAZZI LATEX FREE 40580

## (undated) DEVICE — SOL NACL 0.9% INJ 1000ML BAG 2B1324X

## (undated) DEVICE — SUCTION CANISTER MEDIVAC LINER 3000ML W/LID 65651-530

## (undated) DEVICE — PREP CHLORAPREP 26ML TINTED ORANGE  260815

## (undated) DEVICE — CAST PADDING 4" STERILE 9044S

## (undated) DEVICE — DRAPE EXTREMITY BILAT

## (undated) DEVICE — LINEN TOWEL PACK X5 5464

## (undated) DEVICE — PACK EXTREMITY SOP15EXFSD

## (undated) DEVICE — IMM LIMB ELEVATOR DC40-0203

## (undated) DEVICE — ESU HANDPIECE BIPOLAR THUNDERBEAT FC 5MMX35CM TB-0535FC

## (undated) DEVICE — GLOVE PROTEXIS W/NEU-THERA 7.5  2D73TE75

## (undated) DEVICE — TUBING C02 INSUFFLATION LAP FILTER HEATER 6198

## (undated) DEVICE — SPONGE RAY-TEC 4X8" 7318

## (undated) DEVICE — SUCTION TIP YANKAUER W/O VENT K86

## (undated) DEVICE — GLOVE PROTEXIS W/NEU-THERA 7.0  2D73TE70

## (undated) DEVICE — GOWN XXLG REINFORCED 9071EL

## (undated) DEVICE — GLOVE PROTEXIS MICRO 8.5  2D73PM85

## (undated) DEVICE — DRSG STERI STRIP 1/2X4" R1547

## (undated) DEVICE — ESU CORD MONOPOLAR 10'  E0510

## (undated) DEVICE — DRSG STERI STRIP 1/4X3" R1541

## (undated) DEVICE — SU VICRYL 0 UR-6 27" J603H

## (undated) DEVICE — SU DERMABOND MINI DHVM12

## (undated) DEVICE — BNDG ELASTIC 4"X5YDS UNSTERILE 6611-40

## (undated) DEVICE — DRSG BANDAID 1X3" FABRIC CURITY LATEX FREE KC44101

## (undated) DEVICE — ENDO TROCAR 11MM VERSASTEP VS101011P

## (undated) DEVICE — PEN MARKING SKIN FINE 31145942

## (undated) DEVICE — DRSG GAUZE 4X4" 3033

## (undated) DEVICE — GLOVE PROTEXIS W/NEU-THERA 8.0  2D73TE80

## (undated) DEVICE — PREP DURAPREP 26ML APL 8630

## (undated) RX ORDER — SCOLOPAMINE TRANSDERMAL SYSTEM 1 MG/1
PATCH, EXTENDED RELEASE TRANSDERMAL
Status: DISPENSED
Start: 2017-04-24

## (undated) RX ORDER — CEFAZOLIN SODIUM 2 G/100ML
INJECTION, SOLUTION INTRAVENOUS
Status: DISPENSED
Start: 2017-04-24

## (undated) RX ORDER — PROPOFOL 10 MG/ML
INJECTION, EMULSION INTRAVENOUS
Status: DISPENSED
Start: 2017-04-24

## (undated) RX ORDER — FENTANYL CITRATE 50 UG/ML
INJECTION, SOLUTION INTRAMUSCULAR; INTRAVENOUS
Status: DISPENSED
Start: 2019-05-20

## (undated) RX ORDER — FENTANYL CITRATE 50 UG/ML
INJECTION, SOLUTION INTRAMUSCULAR; INTRAVENOUS
Status: DISPENSED
Start: 2017-04-24

## (undated) RX ORDER — KETOROLAC TROMETHAMINE 30 MG/ML
INJECTION, SOLUTION INTRAMUSCULAR; INTRAVENOUS
Status: DISPENSED
Start: 2017-04-24

## (undated) RX ORDER — CEFAZOLIN SODIUM 1 G/3ML
INJECTION, POWDER, FOR SOLUTION INTRAMUSCULAR; INTRAVENOUS
Status: DISPENSED
Start: 2017-04-24

## (undated) RX ORDER — BUPIVACAINE HYDROCHLORIDE 2.5 MG/ML
INJECTION, SOLUTION EPIDURAL; INFILTRATION; INTRACAUDAL
Status: DISPENSED
Start: 2017-04-24

## (undated) RX ORDER — HYDROMORPHONE HYDROCHLORIDE 1 MG/ML
INJECTION, SOLUTION INTRAMUSCULAR; INTRAVENOUS; SUBCUTANEOUS
Status: DISPENSED
Start: 2019-05-20

## (undated) RX ORDER — SCOLOPAMINE TRANSDERMAL SYSTEM 1 MG/1
PATCH, EXTENDED RELEASE TRANSDERMAL
Status: DISPENSED
Start: 2019-05-20

## (undated) RX ORDER — DEXAMETHASONE SODIUM PHOSPHATE 4 MG/ML
INJECTION, SOLUTION INTRA-ARTICULAR; INTRALESIONAL; INTRAMUSCULAR; INTRAVENOUS; SOFT TISSUE
Status: DISPENSED
Start: 2017-04-24

## (undated) RX ORDER — ONDANSETRON 2 MG/ML
INJECTION INTRAMUSCULAR; INTRAVENOUS
Status: DISPENSED
Start: 2017-04-24

## (undated) RX ORDER — GLYCOPYRROLATE 0.2 MG/ML
INJECTION, SOLUTION INTRAMUSCULAR; INTRAVENOUS
Status: DISPENSED
Start: 2017-04-24

## (undated) RX ORDER — ONDANSETRON 2 MG/ML
INJECTION INTRAMUSCULAR; INTRAVENOUS
Status: DISPENSED
Start: 2019-05-20

## (undated) RX ORDER — CEFAZOLIN SODIUM 2 G/100ML
INJECTION, SOLUTION INTRAVENOUS
Status: DISPENSED
Start: 2019-05-20

## (undated) RX ORDER — LIDOCAINE HYDROCHLORIDE 10 MG/ML
INJECTION, SOLUTION EPIDURAL; INFILTRATION; INTRACAUDAL; PERINEURAL
Status: DISPENSED
Start: 2019-05-20

## (undated) RX ORDER — OXYCODONE AND ACETAMINOPHEN 5; 325 MG/1; MG/1
TABLET ORAL
Status: DISPENSED
Start: 2019-05-20

## (undated) RX ORDER — VECURONIUM BROMIDE 1 MG/ML
INJECTION, POWDER, LYOPHILIZED, FOR SOLUTION INTRAVENOUS
Status: DISPENSED
Start: 2017-04-24

## (undated) RX ORDER — LIDOCAINE HYDROCHLORIDE 20 MG/ML
INJECTION, SOLUTION EPIDURAL; INFILTRATION; INTRACAUDAL; PERINEURAL
Status: DISPENSED
Start: 2017-04-24

## (undated) RX ORDER — LIDOCAINE HYDROCHLORIDE 20 MG/ML
INJECTION, SOLUTION EPIDURAL; INFILTRATION; INTRACAUDAL; PERINEURAL
Status: DISPENSED
Start: 2019-05-20

## (undated) RX ORDER — PROPOFOL 10 MG/ML
INJECTION, EMULSION INTRAVENOUS
Status: DISPENSED
Start: 2019-05-20

## (undated) RX ORDER — PHENAZOPYRIDINE HYDROCHLORIDE 200 MG/1
TABLET, FILM COATED ORAL
Status: DISPENSED
Start: 2017-04-24

## (undated) RX ORDER — DEXAMETHASONE SODIUM PHOSPHATE 4 MG/ML
INJECTION, SOLUTION INTRA-ARTICULAR; INTRALESIONAL; INTRAMUSCULAR; INTRAVENOUS; SOFT TISSUE
Status: DISPENSED
Start: 2019-05-20